# Patient Record
Sex: MALE | Race: WHITE | NOT HISPANIC OR LATINO | URBAN - METROPOLITAN AREA
[De-identification: names, ages, dates, MRNs, and addresses within clinical notes are randomized per-mention and may not be internally consistent; named-entity substitution may affect disease eponyms.]

---

## 2024-05-16 ENCOUNTER — TELEPHONE (OUTPATIENT)
Dept: UROLOGY | Facility: CLINIC | Age: 59
End: 2024-05-16
Payer: OTHER GOVERNMENT

## 2024-05-17 NOTE — PROGRESS NOTES
"Subjective:      Freedom Carranza is a 59 y.o. male who presents today regarding his     Referred by Dr Layton for elevated psa    No LUTS  Slow stream but not bothersome  Nocturia*1 not bothersome    Pt is     2 uncles with prostate ca  Diagnosed at 80yo and approx 64yo    His father-in-law recently passed away  with pca      .    The following portions of the patient's history were reviewed and updated as appropriate: allergies, current medications, past family history, past medical history, past social history, past surgical history and problem list.    Review of Systems  Pertinent items are noted in HPI.  A comprehensive multipoint review of systems was negative except as otherwise stated in the HPI.    No past medical history on file.  No past surgical history on file.    Review of patient's allergies indicates:  Not on File       Objective:   Vitals: There were no vitals taken for this visit.    Physical Exam   General: alert and oriented, no acute distress  Respiratory: Symmetric expansion, non-labored breathing  Cardiovascular: no peripheral edema  Abdomen: soft, non distended  Skin: normal coloration and turgor, no rashes, no suspicious skin lesions noted  Neuro: no gross deficits  Psych: normal judgment and insight, normal mood/affect, and non-anxious  NICO nodule in right mid prostate      Physical Exam    Lab Review   Urinalysis demonstrates negative for all components  No results found for: "WBC", "HGB", "HCT", "MCV", "PLT"  No results found for: "CREATININE", "BUN"    PSA 4.6  Previously 1.5 per Dr Layton's notes    5/10/2024  4.86  11/2023  1.8  5/5/2023  3.38  10/2022  1.7  4/28/2022  2.14  4/26/2021  1.3      Imaging  -    Assessment and Plan:   Prostate nodule    Elevated psa        MRI prostate  UA and reflex cs  Cipro  enemas  BMP  Uronav prostate biopsy    RTC 2 weeks after above to discuss path      "

## 2024-05-20 ENCOUNTER — OFFICE VISIT (OUTPATIENT)
Dept: UROLOGY | Facility: CLINIC | Age: 59
End: 2024-05-20
Payer: OTHER GOVERNMENT

## 2024-05-20 VITALS
DIASTOLIC BLOOD PRESSURE: 62 MMHG | OXYGEN SATURATION: 100 % | RESPIRATION RATE: 12 BRPM | HEART RATE: 65 BPM | SYSTOLIC BLOOD PRESSURE: 109 MMHG

## 2024-05-20 DIAGNOSIS — N40.2 PROSTATE NODULE: Primary | ICD-10-CM

## 2024-05-20 LAB
BILIRUB UR QL STRIP: NEGATIVE
CLARITY UR REFRACT.AUTO: CLEAR
COLOR UR AUTO: YELLOW
GLUCOSE UR QL STRIP: NEGATIVE
HGB UR QL STRIP: NEGATIVE
KETONES UR QL STRIP: NEGATIVE
LEUKOCYTE ESTERASE UR QL STRIP: NEGATIVE
NITRITE UR QL STRIP: NEGATIVE
PH UR STRIP: 7 [PH] (ref 5–8)
PROT UR QL STRIP: ABNORMAL
SP GR UR STRIP: 1.02 (ref 1–1.03)
URN SPEC COLLECT METH UR: ABNORMAL

## 2024-05-20 PROCEDURE — 81003 URINALYSIS AUTO W/O SCOPE: CPT | Performed by: UROLOGY

## 2024-05-20 PROCEDURE — 99204 OFFICE O/P NEW MOD 45 MIN: CPT | Mod: S$GLB,,, | Performed by: UROLOGY

## 2024-05-20 RX ORDER — CIPROFLOXACIN 500 MG/1
500 TABLET ORAL 2 TIMES DAILY
Qty: 4 TABLET | Refills: 0 | Status: SHIPPED | OUTPATIENT
Start: 2024-05-20 | End: 2024-05-22

## 2024-05-20 NOTE — Clinical Note
UA and reflex cs Please review prostate biopsy instructions Uronav biopsy RTC 2 weeks after biopsy to review path

## 2024-06-12 ENCOUNTER — TELEPHONE (OUTPATIENT)
Dept: UROLOGY | Facility: CLINIC | Age: 59
End: 2024-06-12
Payer: OTHER GOVERNMENT

## 2024-06-12 NOTE — TELEPHONE ENCOUNTER
----- Message from Rafiq Krishnamurthy MA sent at 6/12/2024 12:56 PM CDT -----    ----- Message -----  From: Kristel Galvan  Sent: 6/12/2024  11:29 AM CDT  To: Sarath Lauren Staff    Name of Who is Calling: JENSEN TAY [[id number]]          What is the request in detail: Pt is requesting a call back regarding a few questions he have before his procedure on 6/25. Please assist.           Can the clinic reply by [facility]: No          What Number to Call Back if not in [facility]: [phone]

## 2024-06-12 NOTE — TELEPHONE ENCOUNTER
Long discussion about upcoming POC. Reviewed biopsy and MRI. Billing's number given per request.

## 2024-06-17 ENCOUNTER — HOSPITAL ENCOUNTER (OUTPATIENT)
Dept: RADIOLOGY | Facility: HOSPITAL | Age: 59
Discharge: HOME | End: 2024-06-17
Attending: UROLOGY
Payer: OTHER GOVERNMENT

## 2024-06-17 DIAGNOSIS — N40.2 PROSTATE NODULE: ICD-10-CM

## 2024-06-17 PROCEDURE — 25500020 PHARM REV CODE 255: Performed by: UROLOGY

## 2024-06-17 PROCEDURE — 72197 MRI PELVIS W/O & W/DYE: CPT | Mod: 26,,, | Performed by: INTERNAL MEDICINE

## 2024-06-17 PROCEDURE — A9585 GADOBUTROL INJECTION: HCPCS | Performed by: UROLOGY

## 2024-06-17 PROCEDURE — 72197 MRI PELVIS W/O & W/DYE: CPT | Mod: TC

## 2024-06-17 RX ORDER — GADOBUTROL 604.72 MG/ML
10 INJECTION INTRAVENOUS
Status: COMPLETED | OUTPATIENT
Start: 2024-06-17 | End: 2024-06-17

## 2024-06-17 RX ADMIN — GADOBUTROL 10 ML: 604.72 INJECTION INTRAVENOUS at 05:06

## 2024-06-21 ENCOUNTER — PROCEDURE VISIT (OUTPATIENT)
Dept: UROLOGY | Facility: CLINIC | Age: 59
End: 2024-06-21
Payer: OTHER GOVERNMENT

## 2024-06-21 VITALS
WEIGHT: 184.31 LBS | SYSTOLIC BLOOD PRESSURE: 129 MMHG | DIASTOLIC BLOOD PRESSURE: 77 MMHG | TEMPERATURE: 98 F | HEART RATE: 80 BPM | RESPIRATION RATE: 17 BRPM

## 2024-06-21 DIAGNOSIS — N40.2 PROSTATE NODULE: Primary | ICD-10-CM

## 2024-06-21 RX ORDER — LIDOCAINE HYDROCHLORIDE 10 MG/ML
20 INJECTION INFILTRATION; PERINEURAL
Status: COMPLETED | OUTPATIENT
Start: 2024-06-21 | End: 2024-06-21

## 2024-06-21 RX ORDER — LIDOCAINE HYDROCHLORIDE 20 MG/ML
JELLY TOPICAL
Status: COMPLETED | OUTPATIENT
Start: 2024-06-21 | End: 2024-06-21

## 2024-06-21 RX ORDER — CEFTRIAXONE 1 G/1
1 INJECTION, POWDER, FOR SOLUTION INTRAMUSCULAR; INTRAVENOUS
Status: COMPLETED | OUTPATIENT
Start: 2024-06-21 | End: 2024-06-21

## 2024-06-21 RX ADMIN — CEFTRIAXONE 1 G: 1 INJECTION, POWDER, FOR SOLUTION INTRAMUSCULAR; INTRAVENOUS at 12:06

## 2024-06-21 RX ADMIN — LIDOCAINE HYDROCHLORIDE 20 ML: 10 INJECTION INFILTRATION; PERINEURAL at 12:06

## 2024-06-21 RX ADMIN — LIDOCAINE HYDROCHLORIDE: 20 JELLY TOPICAL at 12:06

## 2024-06-21 NOTE — PATIENT INSTRUCTIONS
What to Expect After a Prostate Biopsy    You may have mild bleeding from the rectum or urine for about 1 week to 1 month, or in your ejaculate for several months. This bleeding is normal and expected, and it will stop. You may have mild discomfort in your rectal or urethral area for 24-48 hours.    You cannot do any strenuous lifting, straining, or exercising for 24 hours. You may return to full activity the day after the biopsy.    You may continue to take all your regular medications after the procedure except for the blood thinners.    You may resume all blood-thinning medications once you no longer see any bleeding or whenever your physician prescribing the medication says it is all right to do so. You may take Tylenol if you have a fever and your temperature is less than 100° F or if you have some discomfort.    You will receive a call from the Urology Department at [facility] with the results of your prostate biopsy within one week.    Signs and Symptoms to Report    Call your [facility] urologist at [phone] if you develop any of the following:  Temperature greater than 101°  F  Inability to urinate  A large amount of bleeding from the rectum or in the urine  Persistent or severe pain    After hours or on weekends, you may reach a urology resident on call at this number: [phone].

## 2024-06-21 NOTE — PROCEDURES
"TRUS    Date/Time: 6/21/2024 12:41 PM    Performed by: Leonidas Clemens MD  Authorized by: Leonidas Clemens MD    Consent Done?:  Yes (Written)  Time out: Immediately prior to procedure a "time out" was called to verify the correct patient, procedure, equipment, support staff and site/side marked as required.    Indications: Prostate Nodules and Elevated PSA    Preparation: Patient was prepped and draped in usual sterile fashion    Position:  Left lateral  Anesthesia:  Lidocaine jelly, Pudendal nerve block and 20cc's 1% Lidocaine  Patient sedated: No    Prostate Size:  52  Lesions:: Yes         Type:  Mixed hypo- and hyperechoic  Left Base Biopsies: 2  Left Mid Biopsies: 2  Left [place] Biopsies: 2  Right Base Biopsies: 2  Right Mid Biopsies: 2  Right [place] Biopsies: 2  Transitional zone: No    Total Biopsies:  12    Patient tolerance:  Patient tolerated the procedure well with no immediate complications     MR and US imaged segmented and co-registered with uronav  Subtle mixed hypo-hyperechoic area corresponding to PIRADS5 nodule on exam    4 additional cores taken from target for total of 16 cores    Urine cs neg  cipro and enemas and Rocephin done      Standard instructions given  RTC 2 weeks to discuss pathology results    If path is +, we will get PSMA PET to eval bone lesion      "

## 2024-07-02 ENCOUNTER — OFFICE VISIT (OUTPATIENT)
Dept: UROLOGY | Facility: CLINIC | Age: 59
End: 2024-07-02
Payer: OTHER GOVERNMENT

## 2024-07-02 ENCOUNTER — TELEPHONE (OUTPATIENT)
Dept: UROLOGY | Facility: CLINIC | Age: 59
End: 2024-07-02
Payer: OTHER GOVERNMENT

## 2024-07-02 VITALS — HEART RATE: 66 BPM | DIASTOLIC BLOOD PRESSURE: 73 MMHG | SYSTOLIC BLOOD PRESSURE: 112 MMHG | WEIGHT: 183.56 LBS

## 2024-07-02 DIAGNOSIS — C61 PROSTATE CANCER: Primary | ICD-10-CM

## 2024-07-02 PROCEDURE — 99213 OFFICE O/P EST LOW 20 MIN: CPT | Mod: PBBFAC | Performed by: UROLOGY

## 2024-07-02 PROCEDURE — 99999 PR PBB SHADOW E&M-EST. PATIENT-LVL III: CPT | Mod: PBBFAC,,, | Performed by: UROLOGY

## 2024-07-02 PROCEDURE — 99215 OFFICE O/P EST HI 40 MIN: CPT | Mod: S$PBB,,, | Performed by: UROLOGY

## 2024-07-02 RX ORDER — ALLOPURINOL 100 MG/1
100 TABLET ORAL
COMMUNITY

## 2024-07-02 NOTE — Clinical Note
PSMA PET Prolaris genetic testing Genetics referral for germline testing RTC to see Dr Clemens and Dr Charles after above

## 2024-07-02 NOTE — PROGRESS NOTES
"Subjective:      Freedom Carranza is a 59 y.o. male who returns today regarding his     Hematuria resolved  No blood in the stool.    No fever        The following portions of the patient's history were reviewed and updated as appropriate: allergies, current medications, past family history, past medical history, past social history, past surgical history and problem list.    Review of Systems  Pertinent items are noted in HPI.  A comprehensive multipoint review of systems was negative except as otherwise stated in the HPI.    No past medical history on file.  No past surgical history on file.    Review of patient's allergies indicates:  No Known Allergies       Objective:   Vitals: There were no vitals taken for this visit.    Physical Exam   General: alert and oriented, no acute distress  Respiratory: Symmetric expansion, non-labored breathing  Cardiovascular: no peripheral edema  Abdomen: soft, non distended  Skin: normal coloration and turgor, no rashes, no suspicious skin lesions noted  Neuro: no gross deficits  Psych: normal judgment and insight, normal mood/affect, and non-anxious    Physical Exam    Lab Review   Urinalysis demonstrates no specimen    No results found for: "WBC", "HGB", "HCT", "MCV", "PLT"  No results found for: "CREATININE", "BUN"    No results found for: "PSA", "PSADIAG", "PSATOTAL", "PSAFREE", "PSAFREEPCT"    Final Pathologic Diagnosis 1. Prostate, left apex, biopsy:      - Benign prostatic tissue    2. Prostate, left middle, biopsy:      - Benign prostatic tissue    3. Prostate, left base, biopsy:      - Benign prostatic tissue    4. Prostate, right apex, biopsy:      - Prostatic acinar adenocarcinoma      - [place] grade: 3+4, Group 2      - The percentage of Jose grade 4: 5%      - One of two cores positive for carcinoma      - The percentage of tissue with carcinoma: 25%      - The linear amount of tissue with carcinoma: 3 mm      5. Prostate, right middle, biopsy:      - Prostatic " acinar adenocarcinoma      - [place] grade: 4+4, Group 4      - One of two cores positive for carcinoma      - The percentage of tissue with carcinoma: 10%      - The linear amount of tissue with carcinoma: 0.5 mm      6. Prostate, right base, biopsy:      - Benign prostatic tissue      7. Prostate, target lesion, biopsy:      - Prostatic acinar adenocarcinoma      - [place] grade: 4+3, Group 3      - The percentage of [place] grade 4: 80%      - The linear amount of tissue with carcinoma: the specimen is fragmented, overall 8 mm      - The percentage of tissue with carcinoma: the specimen is fragmented, overall 50%   Comment: Interp By Wendie Uriostegui M.D., Signed on 06/28/2024 at 10:32       PSA 2.59 6/15/2024  4.86 5/11/2024    Imaging  MRI PROSTATE W W/O CONTRAST     CLINICAL HISTORY:  Prostate cancer suspected;nodule on right mid prostate; psa 4.8;  Nodular prostate without lower urinary tract symptoms     Additional history: None provided.     TECHNIQUE:  Multiparametric MRI of the prostate/pelvis performed on a 3T scanner with phase pelvic coil. Multiplanar, multisequence images including high resolution, small field-of-view T2-WI; axial diffusion weighted images with multiple B-values and creation of ADC-maps; and dynamic contrast enhanced T1-weighted images through the prostate were obtained before, during, and after the administration of 10 cc intravenous gadolinium.     COMPARISON:  None.     FINDINGS:  Previous biopsy: None     PSA: 4.6 ng/mL 05/20/2024     Prior therapy: None     Prostate: 5.5 x 3.7 x 4.8 cm corresponding to a computed volume of 50 cc.     Peripheral zone: Suspicious lesion within the right peripheral zone.  Right peripheral zone is atrophic.     Lesion (MARV) #P-1     Location: Side: right; Region: Mid/apex; Zone: posterior peripheral zone laterally     Greatest dimension: 1.5 cm     T2-WI: Same as 4 but ?1.5 cm in greatest dimension or definite extraprostatic extension/invasive behavior,  score 5.     DWI/ADC: Same as 4 but ?1.5 cm in greatest dimension or definite extraprostatic extension/invasive behavior, score 5.     DCE: Positive     Extraprostatic extension: Broad abutment of the posterior capsule.  No ayah extraprostatic extension.     PI-RADS assessment category: 5     Transitional zone: Benign prostatic hyperplasia without focal suspicious abnormality.     Neurovascular bundle: Normal appearance.     Seminal vesicles: Normal appearance.     Adjacent Organ Involvement: No evidence for urinary bladder or rectal invasion.     Lymphadenopathy: None.     Other Findings: Hypointense lesion within the right pubic bone measuring 1.1 cm, nonspecific.     Impression:     1.5 cm PI-RADS 5 lesion in the right peripheral zone.  There is broad-based capsular abutment but no ayah extraprostatic extension.     No regional lymphadenopathy.     1.1 cm lesion in the right pubic bone, which is nonspecific.  Osseous metastasis is unlikely given PSA values.     Overall Assessment: PI-RADS 5 - Very high (clinically significant cancer is highly likely to be present).     Number of targets created for potential MR/US fusion biopsy:     Peripheral zone: 1     Transition zone: 0     Electronically signed by resident: Alex Fagan  Date:                                            06/18/2024  Time:                                           08:22     Electronically signed by:Dae Plummer  Date:                                            06/18/2024  Time:                                           21:04    Assessment and Plan:   Prostate cancer  Beattie 8 4+4 GG4 cT2 cN0 cMx; psa 2.59    We discussed that he has prostate cancer and the high risk nature of his disease.  We discussed the risks and benefits of sugery, radiation, chemotherapy, hormonal therapy, and combinations of these.  We discussed open and robotic surgical approaches.  We discussed that he is likely to need multiple forms of treatment given his high risk  disease.  We discussed that active surveillance may not be the best option with high risk disease.  We discussed referral to radiation oncology and medical oncology.    PSMA PET then see me and rad onc asap  Prolaris somatic genetic testing    Bone lesion  PSMA PET      Fam history of prostate cancer  Genetics referral      I spent 45 min on the day of this encounter preparing for, treating and managing the above

## 2024-07-10 ENCOUNTER — HOSPITAL ENCOUNTER (OUTPATIENT)
Dept: RADIOLOGY | Facility: HOSPITAL | Age: 59
Discharge: HOME | End: 2024-07-10
Attending: UROLOGY
Payer: OTHER GOVERNMENT

## 2024-07-10 DIAGNOSIS — C61 PROSTATE CANCER: ICD-10-CM

## 2024-07-10 PROCEDURE — 78815 PET IMAGE W/CT SKULL-THIGH: CPT | Mod: 26,PI,, | Performed by: NUCLEAR MEDICINE

## 2024-07-10 PROCEDURE — A9595 HC PIFLUFOLASTAT F-18, DX, PER 1 MCI: HCPCS | Mod: TB | Performed by: UROLOGY

## 2024-07-10 PROCEDURE — 78815 PET IMAGE W/CT SKULL-THIGH: CPT | Mod: TC

## 2024-07-10 RX ADMIN — PIFLUFOLASTAT F-18 10.5 MILLICURIE: 80 INJECTION INTRAVENOUS at 02:07

## 2024-07-22 ENCOUNTER — OFFICE VISIT (OUTPATIENT)
Dept: RADIATION ONCOLOGY | Facility: CLINIC | Age: 59
End: 2024-07-22
Attending: RADIOLOGY
Payer: OTHER GOVERNMENT

## 2024-07-22 VITALS
DIASTOLIC BLOOD PRESSURE: 64 MMHG | OXYGEN SATURATION: 98 % | HEIGHT: 72 IN | SYSTOLIC BLOOD PRESSURE: 110 MMHG | WEIGHT: 179.5 LBS | TEMPERATURE: 98 F | RESPIRATION RATE: 16 BRPM | BODY MASS INDEX: 24.31 KG/M2 | HEART RATE: 60 BPM

## 2024-07-22 DIAGNOSIS — C61 PROSTATE CANCER: ICD-10-CM

## 2024-07-22 PROCEDURE — 99214 OFFICE O/P EST MOD 30 MIN: CPT | Mod: PBBFAC | Performed by: RADIOLOGY

## 2024-07-22 PROCEDURE — 99999 PR PBB SHADOW E&M-EST. PATIENT-LVL IV: CPT | Mod: PBBFAC,,, | Performed by: RADIOLOGY

## 2024-07-22 PROCEDURE — 99205 OFFICE O/P NEW HI 60 MIN: CPT | Mod: S$PBB,,, | Performed by: RADIOLOGY

## 2024-07-22 NOTE — PROGRESS NOTES
Multidisciplinary Uro-Oncology Clinic  [facility] - Radiation Oncology     HISTORY OF PRESENT ILLNESS:   This patient presents for discussion of treatment options for a recently diagnosed prostate cancer.     Mr. Carranza was referred to Dr. Cleemns for evaluation of an elevated PSA of 4.86 ng/ml.  The patient has a family history of prostate cancer in his uncles.  NICO revealed a nodule on the Rt.   MRI revealed a 50 cc prostate with a 1.5 cm PI-RADS 5 lesion in the Rt. mid/apex with broad abutment of the posterior capsule.  The seminal vesicles and neurovascular bundles were unremarkable. There was no adenopathy. There was a 1.1 cm non specific lesion in the Rt. pubic bone.  Biopsies on 6/21/24 revealed Jose 8 (4+4) adenocarcinoma involving 10% of 1/2 cores from the Rt.. mid gland.  There was Jose 7 (4+3) carcinoma involving 50% of the cores from the target lesion.  The [place] score 4 accounted for 80% of the tumor.  There was [place] 7 (3+4) adenocarcinoma involving 25% of 1/2 cores from the Rt. apex.  PSMA multifocal low level tracer avidity in the prostate with no evidence of regional or distant metastatic disease.  The patient presents for discussion of treatment options.     REVIEW OF SYSTEMS:   Review of Systems   Constitutional:  Negative for chills, fever, malaise/fatigue and weight loss.   Gastrointestinal:  Negative for constipation and diarrhea.   Genitourinary:  Negative for dysuria, frequency, hematuria and urgency.        AUA 1, 1, 1, 0, 2, 0, 2  delighted   TJ 24  EPIC 26 completed     PAST MEDICAL HISTORY:  Past Medical History:   Diagnosis Date    Gout, unspecified     Mixed hyperlipidemia     Unspecified astigmatism, unspecified eye     Unspecified sensorineural hearing loss        PAST SURGICAL HISTORY:  History reviewed. No pertinent surgical history.    ALLERGIES:   Review of patient's allergies indicates:  No Known Allergies    MEDICATIONS:  Current Outpatient  Medications   Medication Sig    allopurinoL (ZYLOPRIM) 100 MG tablet Take 100 mg by mouth.     No current facility-administered medications for this visit.       SOCIAL HISTORY:  Social History     Socioeconomic History    Marital status:    Tobacco Use    Smoking status: Never    Smokeless tobacco: Never   Substance and Sexual Activity    Alcohol use: Not Currently     Social Determinants of Health     Financial Resource Strain: Low Risk  (6/14/2024)    Overall Financial Resource Strain (CARDIA)     Difficulty of Paying Living Expenses: Not hard at all   Food Insecurity: No Food Insecurity (6/14/2024)    Hunger Vital Sign     Worried About Running Out of Food in the Last Year: Never true     Ran Out of Food in the Last Year: Never true   Physical Activity: Sufficiently Active (6/14/2024)    Exercise Vital Sign     Days of Exercise per Week: 4 days     Minutes of Exercise per Session: 90 min   Stress: No Stress Concern Present (6/14/2024)    [place] [employer] - Occupational Stress Questionnaire     Feeling of Stress : Only a little   Housing Stability: Unknown (6/14/2024)    Housing Stability Vital Sign     Unable to Pay for Housing in the Last Year: No       FAMILY HISTORY:  Family History   Problem Relation Name Age of Onset    Stroke Mother      Hypertension Mother      Heart disease Mother      Hypertension Father      Cancer Father          Skin cancer    COPD Father      Deep vein thrombosis Maternal Grandfather      Kidney disease Paternal Grandmother      Alzheimer's disease Paternal Grandmother      Hypertension Paternal Grandfather      Heart disease Paternal Grandfather      Cancer Maternal Uncle Uncle- Maternal 64        Prostate and pancreatic    Cancer Maternal Uncle Uncle-Maternal         Bladder and Prostate    Cancer Maternal Aunt          Chronic leukemia    Cancer Maternal Aunt  66        Colorectal    Hypertension Paternal Uncle      Heart disease Paternal Uncle       Cirrhosis Paternal Uncle      Heart disease Paternal Uncle      Cancer Paternal Uncle          Skin    Heart disease Paternal Aunt      Hypertension Paternal Aunt      Alzheimer's disease Paternal Aunt      Diabetes Paternal Aunt           PHYSICAL EXAMINATION:  Vitals:    24 1258   BP: 110/64   Pulse: 60   Resp: 16   Temp: 97.6 °F (36.4 °C)     Physical Exam  Constitutional:       General: He is not in acute distress.     Appearance: Normal appearance.   Neurological:      Mental Status: He is alert and oriented to person, place, and time.   Psychiatric:         Mood and Affect: Mood normal.         Judgment: Judgment normal.       ASSESSMENT/PLAN:  Clinical stage IIC (T2b, N0, M0, GG4, PSA < 10) adenocarcinoma of the prostate    ECO    I had a long talk with Freedom and his wife, Maday.  We reviewed his presentation and explained he is considered to have favorable high risk prostate cancer.  Discussed the implications of this classification and discussed the management options.  Explained based on his age and stage, we would recommend he consider some form of definitive therapy.  Discussed the options of RALP with bilateral node dissection vs definitive radiotherapy using IMRT / IGRT techniques +/- brachytherapy boost.  We discussed the pros and cons of each approach.  Discussed the procedures, risks and benefits of each approach.  Explained the acute and long term side effects of treatment. We reviewed the data supporting combined modality with radiotherapy and hormonal deprivation therapy.  Discussed germline testing and obtaining a Polaris molecular score. The patient would like to proceed with radiotherapy..  Will plan to check the progress of his Polaris evaluation.  Thank you for allowing us to participate in the care of this patient.      Psychosocial Distress screening score of Distress Score: 4 noted and reviewed. No intervention indicated.     I spent approximately 60 minutes reviewing the  available records and evaluating the patient, out of which over 50% of the time was spent face to face with the patient in counseling and coordinating this patient's care.

## 2024-07-23 ENCOUNTER — TELEPHONE (OUTPATIENT)
Dept: UROLOGY | Facility: CLINIC | Age: 59
End: 2024-07-23
Payer: OTHER GOVERNMENT

## 2024-07-23 NOTE — TELEPHONE ENCOUNTER
Left message for Chiquita marquez Cornelius zuñiga.  ----- Message from Mala Mccarty CMA sent at 7/22/2024  3:59 PM CDT -----    ----- Message -----  From: Kristel Galvan  Sent: 7/22/2024   3:47 PM CDT  To: Sarath Lauren Staff    Name of Who is Calling:  Brisa from [employer] is calling on behalf of JENSEN TAY [[id number]]          What is the request in detail:  Brisa is calling to advised the test Prolaris that was ordered is not covered by insurance. Please assist. Prolaris          Can the clinic reply by [facility]: No          What Number to Call Back if not in [facility]: [phone]

## 2024-07-31 ENCOUNTER — TELEPHONE (OUTPATIENT)
Dept: UROLOGY | Facility: CLINIC | Age: 59
End: 2024-07-31
Payer: OTHER GOVERNMENT

## 2024-07-31 NOTE — TELEPHONE ENCOUNTER
LVM.    ----- Message from Nia Suárez sent at 7/31/2024 12:05 PM CDT -----  Regarding: Test  Name of Who is Calling:  [employer]ita [employer]          What is the request in detail:  Please contact Chiquita she would like to speak with Vickie about patient , Chiquita stated patient has  and the test is not covered            Can the clinic reply by [facility]: No            What [facility] Back [facility]: [phone] Ext [phone]

## 2024-08-01 ENCOUNTER — TELEPHONE (OUTPATIENT)
Dept: UROLOGY | Facility: CLINIC | Age: 59
End: 2024-08-01
Payer: OTHER GOVERNMENT

## 2024-08-01 NOTE — TELEPHONE ENCOUNTER
Spoke to Chiquita at [employer]- [facility] does not cover Prolaris. She will reach out to patient to see if he wants to proceed w/self-pay.

## 2024-08-29 ENCOUNTER — TELEPHONE (OUTPATIENT)
Dept: RADIATION ONCOLOGY | Facility: CLINIC | Age: 59
End: 2024-08-29
Payer: OTHER GOVERNMENT

## 2024-09-06 ENCOUNTER — TELEPHONE (OUTPATIENT)
Dept: RADIATION ONCOLOGY | Facility: CLINIC | Age: 59
End: 2024-09-06
Payer: OTHER GOVERNMENT

## 2024-09-06 NOTE — TELEPHONE ENCOUNTER
Called to schedule follow-up appointment, no answer. Left a voicemail requesting a call back. Contact information provided.

## 2025-03-07 DIAGNOSIS — M25.511 PAIN, JOINT, SHOULDER, RIGHT: Primary | ICD-10-CM

## 2025-03-11 ENCOUNTER — OFFICE VISIT (OUTPATIENT)
Dept: SPORTS MEDICINE | Facility: CLINIC | Age: 60
End: 2025-03-11
Payer: OTHER GOVERNMENT

## 2025-03-11 ENCOUNTER — HOSPITAL ENCOUNTER (OUTPATIENT)
Dept: RADIOLOGY | Facility: HOSPITAL | Age: 60
Discharge: HOME | End: 2025-03-11
Attending: PHYSICIAN ASSISTANT
Payer: OTHER GOVERNMENT

## 2025-03-11 VITALS
HEART RATE: 63 BPM | BODY MASS INDEX: 26.57 KG/M2 | HEIGHT: 72 IN | SYSTOLIC BLOOD PRESSURE: 99 MMHG | DIASTOLIC BLOOD PRESSURE: 61 MMHG | WEIGHT: 196.19 LBS

## 2025-03-11 DIAGNOSIS — M25.511 CHRONIC RIGHT SHOULDER PAIN: Primary | ICD-10-CM

## 2025-03-11 DIAGNOSIS — M25.611 SHOULDER STIFFNESS, RIGHT: ICD-10-CM

## 2025-03-11 DIAGNOSIS — G89.29 CHRONIC RIGHT SHOULDER PAIN: Primary | ICD-10-CM

## 2025-03-11 DIAGNOSIS — M25.511 PAIN, JOINT, SHOULDER, RIGHT: ICD-10-CM

## 2025-03-11 PROCEDURE — 73030 X-RAY EXAM OF SHOULDER: CPT | Mod: TC,RT

## 2025-03-11 PROCEDURE — 99214 OFFICE O/P EST MOD 30 MIN: CPT | Mod: PBBFAC,25 | Performed by: PHYSICIAN ASSISTANT

## 2025-03-11 PROCEDURE — 73030 X-RAY EXAM OF SHOULDER: CPT | Mod: 26,RT,, | Performed by: RADIOLOGY

## 2025-03-11 PROCEDURE — 99204 OFFICE O/P NEW MOD 45 MIN: CPT | Mod: S$PBB,,, | Performed by: PHYSICIAN ASSISTANT

## 2025-03-11 PROCEDURE — 99999 PR PBB SHADOW E&M-EST. PATIENT-LVL IV: CPT | Mod: PBBFAC,,, | Performed by: PHYSICIAN ASSISTANT

## 2025-03-11 RX ORDER — METFORMIN HYDROCHLORIDE 500 MG/1
TABLET ORAL
COMMUNITY
Start: 2025-01-20

## 2025-03-11 RX ORDER — FINASTERIDE 5 MG/1
TABLET, FILM COATED ORAL
COMMUNITY
Start: 2024-08-16

## 2025-03-11 RX ORDER — TAMSULOSIN HYDROCHLORIDE 0.4 MG/1
CAPSULE ORAL
COMMUNITY
Start: 2024-11-01

## 2025-03-11 RX ORDER — CIPROFLOXACIN 500 MG/1
TABLET ORAL
COMMUNITY
Start: 2024-10-30

## 2025-03-11 RX ORDER — CABERGOLINE 0.5 MG/1
TABLET ORAL
COMMUNITY
Start: 2024-08-16

## 2025-03-11 NOTE — PROGRESS NOTES
CC: RIGHT shoulder pain and stiffness      HPI:  Freedom presents with right shoulder pain ongoing for at least two months. He is unable to recall a specific incident or date when it began. The pain is not only in his shoulder but also in the area around his right ribcage/lat area. He has difficulty raising his right arm with limited range of motion compared to his left arm.    He has been receiving regular massages every couple of weeks, including cupping and shoulder work, but the condition has not improved. His massage therapist noted inflammation in the tissues around the ribcage. He has been taking natural supplements for inflammation, including Phyto Ultra Comfort, which contains turmeric, glucosamine, and chondroitin.    Freedom also reports neck pain, describing a loud crack when he releases tension while lying down. He is unsure if the pain is starting in the neck or radiating up from the shoulder.    He recently completed prostate cancer treatment in [place], [place], which included radiation therapy. During his final round of radiation, the shoulder pain was particularly severe. He considered seeing an orthopedic specialist in [place] but decided to wait until returning home.    Freedom mentions a history of frozen shoulder in his left shoulder a few years ago, which took months to resolve with physical therapy and cortisone injections. He expresses concern about the possibility of prostate cancer metastasis to the bones, though his radiation oncologist has advised that this is unlikely based on his other test results.    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    Freedom denies any numbness or tingling going down his arm, any pain in his back, and any acute injury to the shoulder. Freedom denies any history of diabetes or thyroid disease.    PREVIOUS TREATMENTS:  Freedom underwent treatment for left-sided frozen shoulder a few years ago, before  the pandemic. This treatment included cortisone injections and physical therapy over several months, which led to significant improvement. For his current right shoulder issue, he receives massage therapy every couple of weeks, including cupping and shoulder work, but has experienced minimal benefit over time. Freedom also performs tension release exercises when lying down, resulting in loud cracks in his neck.    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         Is affecting ADLs.  Pain is 3/10 at it's worst.      Past Medical History:   Diagnosis Date    Gout, unspecified     Mixed hyperlipidemia     Unspecified astigmatism, unspecified eye     Unspecified sensorineural hearing loss        History reviewed. No pertinent surgical history.    Family History   Problem Relation Name Age of Onset    Stroke Mother      Hypertension Mother      Heart disease Mother      Hypertension Father      Cancer Father          Skin cancer    COPD Father      Deep vein thrombosis Maternal Grandfather      Kidney disease Paternal Grandmother      Alzheimer's disease Paternal Grandmother      Hypertension Paternal Grandfather      Heart disease Paternal Grandfather      Cancer Maternal Uncle Uncle- Maternal 64        Prostate and pancreatic    Cancer Maternal Uncle Uncle-Maternal         Bladder and Prostate    Cancer Maternal Aunt          Chronic leukemia    Cancer Maternal Aunt  66        Colorectal    Hypertension Paternal Uncle      Heart disease Paternal Uncle      Cirrhosis Paternal Uncle      Heart disease Paternal Uncle      Cancer Paternal Uncle          Skin    Heart disease Paternal Aunt      Hypertension Paternal Aunt      Alzheimer's disease Paternal Aunt      Diabetes Paternal Aunt         Current Medications[1]    Review of patient's allergies indicates:  No Known Allergies       REVIEW OF SYSTEMS:  Constitution: Negative. Negative for chills, fever  "and night sweats.   HENT: Negative for congestion and headaches.    Eyes: Negative for blurred vision, left vision loss and right vision loss.   Cardiovascular: Negative for chest pain and syncope.   Respiratory: Negative for cough and shortness of breath.    Endocrine: Negative for polydipsia, polyphagia and polyuria.   Hematologic/Lymphatic: Negative for bleeding problem. Does not bruise/bleed easily.   Skin: Negative for dry skin, itching and rash.   Musculoskeletal: Negative for falls.  Positive for right shoulder pain and muscle weakness.   Gastrointestinal: Negative for abdominal pain and bowel incontinence.   Genitourinary: Negative for bladder incontinence and nocturia.   Neurological: Negative for disturbances in coordination, loss of balance and seizures.   Psychiatric/Behavioral: Negative for depression. The patient does not have insomnia.    Allergic/Immunologic: Negative for hives and persistent infections.      PHYSICAL EXAMINATION:  Vitals:  BP 99/61   Pulse 63   Ht 5' 11.5" (1.816 m)   Wt 89 kg (196 lb 3.4 oz)   BMI 26.98 kg/m²    General: The patient is alert and oriented x 3.  Mood is pleasant.  Observation of ears, eyes and nose reveal no gross abnormalities.  No labored breathing observed.  Gait is coordinated. Patient can toe walk and heel walk without difficulty.      RIGHT SHOULDER / UPPER EXTREMITY EXAM    OBSERVATION:     Swelling  none  Deformity  none   Discoloration  none   Scapular winging none   Scars   none  Atrophy  none    TENDERNESS / CREPITUS (T/C):          T/C      T/C   Clavicle   -/-  SUPRAspinatus    -/-     AC Jt.    -/-  INFRAspinatus  -/-    SC Jt.    -/-  Deltoid    -/-      G. Tuberosity  -/-  LH BICEP groove  -/-   Acromion:  -/-  Midline Neck   -/-     Scapular Spine -/-  Trapezium   -/-   SMA Scapula  -/-  GH jt. line - post  -/-     Scapulothoracic  -/-         ROM: (* = with pain)  Left shoulder   Right shoulder        AROM (PROM)   AROM (PROM)   FE    170° " (175°)     120°* (125°*)     ER at 0°    60°  (65°)    45°*  (50°*)   ER at 90° ABD  90°  (90°)    50°*  (50°*)   IR at 90°  ABD   60  (60°)     40*  (40°*)      IR (spine level)   T10     Sacrum*    STRENGTH: (* = with pain) Left shoulder   Right shoulder   SCAPTION   5/5    4+/5*    IR    5/5    5/5   ER    5/5    4+/5*   BICEPS   5/5    5/5   Deltoid    5/5    4+/5     SIGNS:  Painful side       NEER   +   OARIKS  +    HARRY   +   SPEEDS  neg     DROP ARM   -   BELLY PRESS neg   Superior escape none    LIFT-OFF  neg   X-Body ADD    +   MOVING VALGUS neg        STABILITY TESTING    Left shoulder   Right shoulder    Translation     Anterior  up face     up face    Posterior  up face    up face    Sulcus   < 10mm    < 10 mm     Signs   Apprehension   neg      neg       Relocation   no change     no change      Jerk test  neg     neg    EXTREMITY NEURO-VASCULAR EXAM:    Sensation grossly intact to light touch all dermatomal regions.    DTR 2+ Biceps, Triceps, BR and Negative Aniyahs sign   Grossly intact motor function at Elbow, Wrist and Hand   Distal pulses radial and ulnar 2+, brisk cap refill, symmetric.      NECK:  Painless FROM and spinous processes non-tender. Negative Spurlings sign.      OTHER FINDINGS:  - scapular dyskinesia    XRAYS right shoulder (3/11/2025):  Xrays including AP, Outlet and Axillary Lateral of shoulder are ordered / images reviewed by me:  No acute fracture or dislocation.  Mild glenohumeral and moderate acromioclavicular joint osteoarthritis noted.  Soft tissues appear normal.          ASSESSMENT:     Right shoulder pain/stiffness, probable adhesive capsulitis      PLAN:      I made the decision to obtain old records of the patient including previous notes and imaging. New imaging was ordered today of the extremity or extremities evaluated. I independently reviewed and interpreted the radiographs and/or MRIs today as well as prior imaging, if available.    We discussed at  length different treatment options including conservative vs surgical management. These include anti-inflammatories, acetaminophen, rest, ice, heat, formal physical therapy including strengthening and stretching exercises, home exercise programs, dry needling, corticosteroid injections, and finally surgical intervention.      Orders placed for MRI without contrast of the right shoulder to assess for possible adhesive capsulitis and concomitant rotator cuff tear    In the interim, continue conservative treatment at home including over-the-counter anti-inflammatories/acetaminophen, regular warm/cool compresses, and activity modification.    Follow up in clinic to discuss MRI results and treatment moving forward.      All questions were answered, patient will contact us for questions or concerns in the interim.      This note was generated with the assistance of a combination of ambient listening technology and medical dictation software. Verbal consent was obtained by the patient and accompanying visitor(s) for the recording of patient appointment to facilitate this note. I attest to having reviewed and edited the generated note for accuracy, though some syntax, spelling errors, or grammatic errors may persist. Please contact the author of this note for any clarification.               [1]   Current Outpatient Medications:     allopurinoL (ZYLOPRIM) 100 MG tablet, Take 100 mg by mouth., Disp: , Rfl:     cabergoline (DOSTINEX) 0.5 mg tablet, , Disp: , Rfl:     ciprofloxacin HCl (CIPRO) 500 MG tablet, , Disp: , Rfl:     finasteride (PROSCAR) 5 mg tablet, , Disp: , Rfl:     metFORMIN (GLUCOPHAGE) 500 MG tablet, , Disp: , Rfl:     tamsulosin (FLOMAX) 0.4 mg Cap, , Disp: , Rfl:

## 2025-03-14 ENCOUNTER — HOSPITAL ENCOUNTER (OUTPATIENT)
Dept: RADIOLOGY | Facility: HOSPITAL | Age: 60
Discharge: HOME | End: 2025-03-14
Attending: PHYSICIAN ASSISTANT
Payer: OTHER GOVERNMENT

## 2025-03-14 DIAGNOSIS — M25.611 SHOULDER STIFFNESS, RIGHT: ICD-10-CM

## 2025-03-14 DIAGNOSIS — M25.511 CHRONIC RIGHT SHOULDER PAIN: ICD-10-CM

## 2025-03-14 DIAGNOSIS — G89.29 CHRONIC RIGHT SHOULDER PAIN: ICD-10-CM

## 2025-03-14 PROCEDURE — 73222 MRI JOINT UPR EXTREM W/DYE: CPT | Mod: 26,RT,, | Performed by: RADIOLOGY

## 2025-03-14 PROCEDURE — 73222 MRI JOINT UPR EXTREM W/DYE: CPT | Mod: TC,RT

## 2025-03-18 ENCOUNTER — OFFICE VISIT (OUTPATIENT)
Dept: SPORTS MEDICINE | Facility: CLINIC | Age: 60
End: 2025-03-18
Payer: OTHER GOVERNMENT

## 2025-03-18 DIAGNOSIS — G89.29 CHRONIC RIGHT SHOULDER PAIN: Primary | ICD-10-CM

## 2025-03-18 DIAGNOSIS — M75.01 ADHESIVE CAPSULITIS OF RIGHT SHOULDER: ICD-10-CM

## 2025-03-18 DIAGNOSIS — M25.511 CHRONIC RIGHT SHOULDER PAIN: Primary | ICD-10-CM

## 2025-03-18 DIAGNOSIS — M25.611 SHOULDER STIFFNESS, RIGHT: ICD-10-CM

## 2025-03-18 PROCEDURE — 99214 OFFICE O/P EST MOD 30 MIN: CPT | Mod: 25,S$PBB,, | Performed by: PHYSICIAN ASSISTANT

## 2025-03-18 PROCEDURE — 99999PBSHW PR PBB SHADOW TECHNICAL ONLY FILED TO HB: Mod: PBBFAC,,,

## 2025-03-18 PROCEDURE — 99999 PR PBB SHADOW E&M-EST. PATIENT-LVL III: CPT | Mod: PBBFAC,,, | Performed by: PHYSICIAN ASSISTANT

## 2025-03-18 PROCEDURE — 99213 OFFICE O/P EST LOW 20 MIN: CPT | Mod: PBBFAC | Performed by: PHYSICIAN ASSISTANT

## 2025-03-18 RX ORDER — CELECOXIB 100 MG/1
100 CAPSULE ORAL 2 TIMES DAILY
Qty: 60 CAPSULE | Refills: 1 | Status: SHIPPED | OUTPATIENT
Start: 2025-03-18

## 2025-03-18 RX ORDER — TRIAMCINOLONE ACETONIDE 40 MG/ML
40 INJECTION, SUSPENSION INTRA-ARTICULAR; INTRAMUSCULAR
Status: DISCONTINUED | OUTPATIENT
Start: 2025-03-18 | End: 2025-03-18 | Stop reason: HOSPADM

## 2025-03-18 RX ADMIN — TRIAMCINOLONE ACETONIDE 40 MG: 40 INJECTION, SUSPENSION INTRA-ARTICULAR; INTRAMUSCULAR at 10:03

## 2025-03-18 NOTE — PROGRESS NOTES
CC: RIGHT shoulder pain and stiffness    Patient is a 59-year-old male who presents today for follow-up evaluation of right shoulder pain.  He denies any new injuries or trauma to the right shoulder since his last visit.  Recently underwent an MRI of the right shoulder, and is here today to discuss diagnosis and treatment options moving forward.    HPI (3/11/2025):  Freedom presents with right shoulder pain ongoing for at least two months. He is unable to recall a specific incident or date when it began. The pain is not only in his shoulder but also in the area around his right ribcage/lat area. He has difficulty raising his right arm with limited range of motion compared to his left arm.    He has been receiving regular massages every couple of weeks, including cupping and shoulder work, but the condition has not improved. His massage therapist noted inflammation in the tissues around the ribcage. He has been taking natural supplements for inflammation, including Phyto Ultra Comfort, which contains turmeric, glucosamine, and chondroitin.    Freedom also reports neck pain, describing a loud crack when he releases tension while lying down. He is unsure if the pain is starting in the neck or radiating up from the shoulder.    He recently completed prostate cancer treatment in [place], [place], which included radiation therapy. During his final round of radiation, the shoulder pain was particularly severe. He considered seeing an orthopedic specialist in [place] but decided to wait until returning home.    Freedom mentions a history of frozen shoulder in his left shoulder a few years ago, which took months to resolve with physical therapy and cortisone injections. He expresses concern about the possibility of prostate cancer metastasis to the bones, though his radiation oncologist has advised that this is unlikely based on his other test results.    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    Freedom denies any numbness or tingling going down his arm, any pain in his back, and any acute injury to the shoulder. Freedom denies any history of diabetes or thyroid disease.    PREVIOUS TREATMENTS:  Freedom underwent treatment for left-sided frozen shoulder a few years ago, before the pandemic. This treatment included cortisone injections and physical therapy over several months, which led to significant improvement. For his current right shoulder issue, he receives massage therapy every couple of weeks, including cupping and shoulder work, but has experienced minimal benefit over time. Freedom also performs tension release exercises when lying down, resulting in loud cracks in his neck.    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         Is affecting ADLs.  Pain is 3/10 at it's worst.      Past Medical History:   Diagnosis Date    Gout, unspecified     Mixed hyperlipidemia     Unspecified astigmatism, unspecified eye     Unspecified sensorineural hearing loss        History reviewed. No pertinent surgical history.    Family History   Problem Relation Name Age of Onset    Stroke Mother      Hypertension Mother      Heart disease Mother      Hypertension Father      Cancer Father          Skin cancer    COPD Father      Deep vein thrombosis Maternal Grandfather      Kidney disease Paternal Grandmother      Alzheimer's disease Paternal Grandmother      Hypertension Paternal Grandfather      Heart disease Paternal Grandfather      Cancer Maternal Uncle Uncle- Maternal 64        Prostate and pancreatic    Cancer Maternal Uncle Uncle-Maternal         Bladder and Prostate    Cancer Maternal Aunt          Chronic leukemia    Cancer Maternal Aunt  66        Colorectal    Hypertension Paternal Uncle      Heart disease Paternal Uncle      Cirrhosis Paternal Uncle      Heart disease Paternal Uncle      Cancer Paternal  Uncle          Skin    Heart disease Paternal Aunt      Hypertension Paternal Aunt      Alzheimer's disease Paternal Aunt      Diabetes Paternal Aunt         Current Medications[1]    Review of patient's allergies indicates:  No Known Allergies       REVIEW OF SYSTEMS:  Constitution: Negative. Negative for chills, fever and night sweats.   HENT: Negative for congestion and headaches.    Eyes: Negative for blurred vision, left vision loss and right vision loss.   Cardiovascular: Negative for chest pain and syncope.   Respiratory: Negative for cough and shortness of breath.    Endocrine: Negative for polydipsia, polyphagia and polyuria.   Hematologic/Lymphatic: Negative for bleeding problem. Does not bruise/bleed easily.   Skin: Negative for dry skin, itching and rash.   Musculoskeletal: Negative for falls.  Positive for right shoulder pain and muscle weakness.   Gastrointestinal: Negative for abdominal pain and bowel incontinence.   Genitourinary: Negative for bladder incontinence and nocturia.   Neurological: Negative for disturbances in coordination, loss of balance and seizures.   Psychiatric/Behavioral: Negative for depression. The patient does not have insomnia.    Allergic/Immunologic: Negative for hives and persistent infections.      PHYSICAL EXAMINATION:  Vitals:  There were no vitals taken for this visit.   General: The patient is alert and oriented x 3.  Mood is pleasant.  Observation of ears, eyes and nose reveal no gross abnormalities.  No labored breathing observed.  Gait is coordinated. Patient can toe walk and heel walk without difficulty.      RIGHT SHOULDER / UPPER EXTREMITY EXAM    OBSERVATION:     Swelling  none  Deformity  none   Discoloration  none   Scapular winging none   Scars   none  Atrophy  none    TENDERNESS / CREPITUS (T/C):          T/C      T/C   Clavicle   -/-  SUPRAspinatus    -/-     AC Jt.    -/-  INFRAspinatus  -/-    SC Jt.    -/-  Deltoid    -/-      G. Tuberosity  -/-  LH BICEP  groove  -/-   Acromion:  -/-  Midline Neck   -/-     Scapular Spine -/-  Trapezium   -/-   SMA Scapula  -/-  GH jt. line - post  -/-     Scapulothoracic  -/-         ROM: (* = with pain)  Left shoulder   Right shoulder        AROM (PROM)   AROM (PROM)   FE    170° (175°)     120°* (125°*)     ER at 0°    60°  (65°)    45°*  (50°*)   ER at 90° ABD  90°  (90°)    50°*  (50°*)   IR at 90°  ABD   60  (60°)     40*  (40°*)      IR (spine level)   T10     Sacrum*    STRENGTH: (* = with pain) Left shoulder   Right shoulder   SCAPTION   5/5    4+/5*    IR    5/5    5/5   ER    5/5    4+/5*   BICEPS   5/5    5/5   Deltoid    5/5    4+/5     SIGNS:  Painful side       NEER   +   OARIKS  +    HARRY   +   SPEEDS  neg     DROP ARM   -   BELLY PRESS neg   Superior escape none    LIFT-OFF  neg   X-Body ADD    +   MOVING VALGUS neg        STABILITY TESTING    Left shoulder   Right shoulder    Translation     Anterior  up face     up face    Posterior  up face    up face    Sulcus   < 10mm    < 10 mm     Signs   Apprehension   neg      neg       Relocation   no change     no change      Jerk test  neg     neg    EXTREMITY NEURO-VASCULAR EXAM:    Sensation grossly intact to light touch all dermatomal regions.    DTR 2+ Biceps, Triceps, BR and Negative Aniyahs sign   Grossly intact motor function at Elbow, Wrist and Hand   Distal pulses radial and ulnar 2+, brisk cap refill, symmetric.      NECK:  Painless FROM and spinous processes non-tender. Negative Spurlings sign.      OTHER FINDINGS:  - scapular dyskinesia    XRAYS right shoulder (3/11/2025):  Xrays including AP, Outlet and Axillary Lateral of shoulder are ordered / images reviewed by me:  No acute fracture or dislocation.  Mild glenohumeral and moderate acromioclavicular joint osteoarthritis noted.  Soft tissues appear normal.    MRI right shoulder (03/14/2025):  Impression:     Subtle low-grade articular surface tear of the anterior fibers of the infraspinatus,  no tendon retraction.  Mild tendinosis of the supraspinatus and infraspinatus.     Mild AC joint osseous hypertrophy and edema.      ASSESSMENT:     Right shoulder pain/stiffness  Adhesive capsulitis of right shoulder      PLAN:      I made the decision to obtain old records of the patient including previous notes and imaging. I independently reviewed and interpreted the radiographs and/or MRIs today as well as prior imaging. Reviewed MRI and radiograph results with patient in detail.    We again discussed at length different treatment options including conservative vs surgical management. These include anti-inflammatories, acetaminophen, rest, ice, heat, formal physical therapy including strengthening and stretching exercises, home exercise programs, dry needling, corticosteroid injections, and finally surgical intervention.      Recommend conservative treatment at this time.  We will proceed with a right shoulder subacromial/glenohumeral corticosteroid injection today.  I will also place a referral to formal physical therapy to work on aggressive range of motion.    Prescription for Celebrex 100 mg to take 1-2 times daily as needed for pain provided today.  Advised patient to refrain from taking other anti-inflammatory medications while taking Celebrex, however may alternate with acetaminophen if needed.    Follow up in approximately 8 weeks for re-evaluation.      All questions were answered, patient will contact us for questions or concerns in the interim.      This note was generated with the assistance of a combination of ambient listening technology and medical dictation software. Verbal consent was obtained by the patient and accompanying visitor(s) for the recording of patient appointment to facilitate this note. I attest to having reviewed and edited the generated note for accuracy, though some syntax, spelling errors, or grammatic errors may persist. Please contact the author of this note for any  clarification.                 [1]   Current Outpatient Medications:     allopurinoL (ZYLOPRIM) 100 MG tablet, Take 100 mg by mouth., Disp: , Rfl:     cabergoline (DOSTINEX) 0.5 mg tablet, , Disp: , Rfl:     celecoxib (CELEBREX) 100 MG capsule, Take 1 capsule (100 mg total) by mouth 2 (two) times daily., Disp: 60 capsule, Rfl: 1    ciprofloxacin HCl (CIPRO) 500 MG tablet, , Disp: , Rfl:     finasteride (PROSCAR) 5 mg tablet, , Disp: , Rfl:     metFORMIN (GLUCOPHAGE) 500 MG tablet, , Disp: , Rfl:     tamsulosin (FLOMAX) 0.4 mg Cap, , Disp: , Rfl:

## 2025-03-18 NOTE — PROCEDURES
Large Joint Aspiration/Injection    Date/Time: 3/18/2025 10:00 AM    Performed by: Steve Cruz PA-C  Authorized by: Steve Cruz PA-C    Consent Done?:  Yes (Verbal)  Indications:  Pain  Site marked: the procedure site was marked    Timeout: prior to procedure the correct patient, procedure, and site was verified    Prep: patient was prepped and draped in usual sterile fashion    Local anesthesia used?: No      Details:  Needle Size:  22 G  Ultrasonic Guidance for needle placement?: No    Approach:  Posterior  Location:  Shoulder  Shoulder joint: R subacromial/glenohumeral.  Medications:  40 mg triamcinolone acetonide 40 mg/mL  Patient tolerance:  Patient tolerated the procedure well with no immediate complications    Injection Procedure  A time out was performed, including verification of patient ID, procedure, site and side, availability of information and equipment, review of safety issues, and agreement with consent, the procedure site was marked.    After time out was performed, the patient was prepped aseptically with povidone-iodine swabsticks. A diagnostic and therapeutic injection of 1:3cc Kenalog/Marcaine was given under sterile technique using a 22g x 1.5 needle from the Posterior  aspect of the right Subacromial/glenohumeral joint in the sitting position.      Freedom Carranza had no adverse reactions to the medication. Pain decreased. He was instructed to apply ice to the joint for 20 minutes and avoid strenuous activities for 24-36 hours following the injection. He was warned of possible blood sugar and/or blood pressure changes during that time. Following that time, he can resume regular activities.    He was reminded to call the clinic immediately for any adverse side effects as explained in clinic today.

## 2025-03-20 ENCOUNTER — CLINICAL SUPPORT (OUTPATIENT)
Dept: REHABILITATION | Facility: OTHER | Age: 60
End: 2025-03-20
Payer: OTHER GOVERNMENT

## 2025-03-20 DIAGNOSIS — M25.511 CHRONIC RIGHT SHOULDER PAIN: ICD-10-CM

## 2025-03-20 DIAGNOSIS — M25.611 SHOULDER STIFFNESS, RIGHT: ICD-10-CM

## 2025-03-20 DIAGNOSIS — M25.511 NONTRAUMATIC PAIN OF RIGHT SHOULDER: ICD-10-CM

## 2025-03-20 DIAGNOSIS — M75.01 ADHESIVE CAPSULITIS OF RIGHT SHOULDER: ICD-10-CM

## 2025-03-20 DIAGNOSIS — G89.29 CHRONIC RIGHT SHOULDER PAIN: ICD-10-CM

## 2025-03-20 DIAGNOSIS — M25.611 DECREASED ROM OF RIGHT SHOULDER: Primary | ICD-10-CM

## 2025-03-20 PROCEDURE — 97161 PT EVAL LOW COMPLEX 20 MIN: CPT | Mod: PN

## 2025-03-20 PROCEDURE — 97530 THERAPEUTIC ACTIVITIES: CPT | Mod: PN

## 2025-03-20 NOTE — PROGRESS NOTES
Outpatient Rehab    Physical Therapy Evaluation    Patient Name: Freedom Carranza  MRN: [id number]  YOB: 1965  Encounter Date: 3/20/2025    Therapy Diagnosis:   Encounter Diagnoses   Name Primary?    Chronic right shoulder pain     Shoulder stiffness, right     Adhesive capsulitis of right shoulder     Decreased ROM of right shoulder Yes    Nontraumatic pain of right shoulder      Physician: Steve Cruz PA-C    Physician Orders: Eval and Treat  Medical Diagnosis: Chronic right shoulder pain  Shoulder stiffness, right  Adhesive capsulitis of right shoulder    Visit # / Visits Authorized:  1 / 1  Insurance Authorization Period: 3/20/2025 to 6/18/2025  Date of Evaluation: 3/20/2025  Plan of Care Certification: 3/20/2025 to 6/12/2025     Time In: 1110   Time Out: 1215  Total Time: 65   Total Billable Time:  30    Intake Outcome Measure for FOTO Survey    Therapist reviewed FOTO scores for Freedom Carranza on 3/20/2025.   FOTO report - see Media section or FOTO account episode details.     Intake Score: 45 (DASH: 55.0)%    Precautions  Range of Motion Restrictions: ROM to tolerance    Standard, recent prostate Cancer tx    Subjective   History of Present Illness  Freedom is a 59 y.o. male who reports to physical therapy with a chief concern of Frozen shoulder and slight tear of RTC.     The patient reports a medical diagnosis of M25.511,G89.29 (ICD-10-CM) - Chronic right shoulder pain  M25.611 (ICD-10-CM) - Shoulder stiffness, right  M75.01 (ICD-10-CM) - Adhesive capsulitis of right shoulder.    Diagnostic tests related to this condition: MRI studies.   MRI Studies Details: 3/14: Impression:     Subtle low-grade articular surface tear of the anterior fibers of the infraspinatus, no tendon retraction.  Mild tendinosis of the supraspinatus and infraspinatus.     Mild AC joint osseous hypertrophy and edema.    Dominant Hand: Right  History of Present Condition/Illness: Right Frozen shoulder and slight tear  of rotator cuff (fraying of fibers). Sx started about 2-3 months. History of frozen shoulder on left. He received a cortisone injection on Tuesday which seems to have helped with reduced pain and improved range of motion. Since symptoms started a few months ago and prior to injection, pain seemed to be slowly getting worse but have now improved.  Falls in the last 12 months: no Burning, tingling, numbness: no Popping, clicking, locking, feeling of instability at the shoulder: popping but no associated pain. Fine motor skill deficits: no  Patient denies dizziness, headaches, blurry vision, nausea, vomiting, impaired sensations in groin and saddle region, changes in bowel/bladder, and unexplained changes in weight.     Activities of Daily Living  Social history was obtained from Patient and Partner.    General Prior Level of Function Comments: Independent  General Current Level of Function Comments: moving more things to the L side, limited ROM, ADL performance, dressing/bathing, reaching       Previously independent with activities of daily living? Yes     Currently independent with activities of daily living? Yes     Modified currently    Previously independent with instrumental activities of daily living? Yes     Currently independent with instrumental activities of daily living? Yes     Modified currently        Pain     Patient reports a current pain level of 2/10. Pain at best is reported as 2/10. Pain at worst is reported as 6/10.   Location: R shoulder (top of the shoulder and down the posterio-lateral aspct of the arm and into rib cage on the R  Clinical Progression (since onset): Improved  Pain Qualities: Sharp, Dull  Pain-Relieving Factors: Heat, Other (Comment), Massage  Other Pain-Relieving Factors: injection; epsom salt bath,  Pain-Aggravating Factors: Movement         Review of Systems  Patient reports: Cancer History  Additional Red Flag Details: Recently completed prostate cancer tx;      Treatment  History  Treatments  Previously Received Treatments: Yes  Previous Treatments: Massage  Currently Receiving Treatments: Yes  Current Treatments: Massage  Additional Treatment Details: Cortisone injection    Living Arrangements  Living Situation  Housing: Home independently  Living Arrangements: Spouse/significant other    Single story shotgun house; adapted activities including reaching with the left and moving a lot of activities over to the left if possible.       Employment  Patient reports: Does the patient's condition impact their ability to work?  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       Past Medical History/Physical Systems Review:   Freedom Carranza  has a past medical history of Gout, unspecified, Mixed hyperlipidemia, Unspecified astigmatism, unspecified eye, and Unspecified sensorineural hearing loss.    Freedom Carranza  has no past surgical history on file.    Freedom has a current medication list which includes the following prescription(s): allopurinol, cabergoline, celecoxib, ciprofloxacin hcl, finasteride, metformin, and tamsulosin.    Review of patient's allergies indicates:  No Known Allergies     Objective   Posture        Shoulders are Rounded. Bilateral scapulae are: Protracted              Shoulder Palpation  Right Shoulder Palpation  Unremarkable: Muscle, Bony Prominence/Bursa, and Tendon/Ligament          Left Shoulder Palpation  Unremarkable: Muscle, Bony Prominence/Bursa, and Tendon/Ligament              Subcranial Range of Motion   Active Restricted? Passive Restricted? Pain   Flexion         Protraction         Retraction           Cervical active range of motion appears normal and functional via observation but no measurements taken.     Shoulder Range of Motion  Right Shoulder   Active (deg) Passive (deg) Pain   Flexion 110 120 Yes   Extension 40       Scaption         ABduction 80 80 Yes   ADduction         Horizontal ABduction         Horizontal ADduction          External Rotation (Shoulder ABducted 0 degrees) 30   Yes   External Rotation (Shoulder ABducted 45 degrees) 10   Yes   External Rotation (Shoulder ABducted 90 degrees)         Internal Rotation (Shoulder ABducted 0 degrees)         Internal Rotation (Shoulder ABducted 45 degrees)         Internal Rotation (Shoulder ABducted 90 degrees)           Left Shoulder   Active (deg) Passive (deg) Pain   Flexion 155       Extension 60       Scaption         ABduction 160       ADduction         Horizontal ABduction         Horizontal ADduction         External Rotation (Shoulder ABducted 0 degrees) 60       External Rotation (Shoulder ABducted 45 degrees) 60       External Rotation (Shoulder ABducted 90 degrees)         Internal Rotation (Shoulder ABducted 0 degrees)         Internal Rotation (Shoulder ABducted 45 degrees)         Internal Rotation (Shoulder ABducted 90 degrees)             Shoulder, Elbow, or Forearm Range of Motion Details: Functional shoulder external rotation: right: C7 with pain; left: T2                                     Functional shoulder internal rotation: right: right lateral buttock with pain; left: TL junction         Shoulder Strength - Planes of Motion   Right Strength Right Pain Left Strength Left  Pain   Flexion 3+ (within available ROM) Yes 4+     Extension 4+ (within available ROM)   5     ABduction 3+ (within available ROM) Yes 4+     ADduction           Horizontal ABduction           Horizontal ADduction           Internal Rotation 0° 5 (within available ROM)   5     Internal Rotation 90°           External Rotation 0° 3+ (within available ROM) Yes 4     External Rotation 90°               Elbow Strength   Right Strength Right Pain Left Strength Left  Pain   Flexion (C6) 5   5     Extension (C7) 5   5                    Shoulder Joint Mobility  Right Shoulder Mobility  Hypomobile: Anterior Capsule Mobility, Posterior Capsule Mobility, and Inferior Capsule Mobility  Left Shoulder  Mobility  Normal: Anterior Capsule Mobility, Posterior Capsule Mobility, and Inferior Capsule Mobility       Right Scapular Mobility  Normal: Distraction  Left Scapular Mobility  Normal: Distraction                   Treatment:  Manual Therapy  MT 1: PROM R shoulder with end range stretching into flexion/ABD/ER/IR to tolerance  Therapeutic Activity  TA 1: HEP review, performance and education - Supine Shoulder External Rotation Stretch  - 1 x daily - 3-5 x weekly - 1 sets - 3 reps - 60 hold  - Seated Shoulder External Rotation PROM on Table  - 1 x daily - 1 sets - 3 reps - 30 seconds hold  - Seated Shoulder Flexion Towel Slide at Table Top  - 1 x daily - 1 sets - 3 reps - 60 hold  - Seated Shoulder Scaption Slide at Table Top with Forearm in Neutral  - 1 x daily - 1 sets - 3 reps - 60 hold  - Seated Shoulder Pendulum Exercise  - 1 x daily - 1 sets - 3 reps - 60 hold  - Standing Shoulder Flexion Wall Walk  - 1 x daily - 3-5 x weekly - 2 sets - 10 reps    Time Entry(in minutes):  PT Evaluation (Low) Time Entry: 35  Manual Therapy Time Entry: 5  Therapeutic Activity Time Entry: 25    Assessment & Plan   Assessment  Freedom            Functional Limitations: Pain when reaching, Driving, Disrupted sleep pattern, Completing self-care activities, Completing work/school activities, Carrying objects, Range of motion, Performing household chores, Participating in leisure activities, Pain with ADLs/IADLs, Reaching  Impairments: Abnormal or restricted range of motion, Impaired physical strength, Lack of appropriate home exercise program, Pain with functional activity    Patient Goal for Therapy (PT): alleviate frozen shoulder, return to painfree normal ROM  Prognosis: Good  Assessment Details:  Freedom is a 59 y.o. male referred to outpatient Physical Therapy with a medical diagnosis of  M25.511,G89.29 (ICD-10-CM) - Chronic right shoulder pain M25.611 (ICD-10-CM) - Shoulder stiffness, right M75.01 (ICD-10-CM) - Adhesive  capsulitis of right shoulder . Patient presents with signs and symptoms consistent with referring diagnosis including impairments in range of motion, strength, flexibility, poor posture and increased pain. These impairments are negatively affecting the patients performance of Activities of daily living and limiting participation with work, recreational and household duties.     Plan  From a physical therapy perspective, the patient would benefit from: Skilled Rehab Services    Planned therapy interventions include: Therapeutic exercise, Therapeutic activities, Neuromuscular re-education, Manual therapy, Gait training, and Other (Comment). dry needling  Planned modalities to include: Cryotherapy (cold pack), Electrical stimulation - passive/unattended, Thermotherapy (hot pack), and Ultrasound.        Visit Frequency: 1 times Per Week for 12 Weeks.       This plan was discussed with Patient and Family.   Discussion participants: Agreed Upon Plan of Care  Plan details: Patient will be seen by a physical therapist minimally every 6th visit or every 30 days.          Patient's spiritual, cultural, and educational needs considered and patient agreeable to plan of care and goals.     Education  Education was done with Patient and Other recipient present.  The patient Verbalizes understanding.  They identified as Spouse/significant other.  The recipient Verbalizes understanding.     Patient was educated on initial evaluation findings and expectations as well as future PT services, procedures, and expectations for optimal compliance with therapy. Home exercise program review, exercise form and purpose. Dry needling vs. Acupuncture; stretching and movements within tolerable ranges avoiding overstressing tissue. Timeframe for frozen shoulder and stages       Goals:   Active       1. short term goal        Patient will be independent with home exercise program to promote improved therapy outcomes.         Start:  03/20/25     Expected End:  05/01/25            Patient will require minimal Verbal cues from physical therapist for proper scapular retraction in order to improve postural awareness.        Start:  03/20/25    Expected End:  05/01/25            Patient will increase pain free passive range of motion of R shoulder flexion to >/= 150 deg and external rotation at 90 degrees abduction to 30 degrees to improve functional mobility of upper extremity.        Start:  03/20/25    Expected End:  05/01/25               2. long term goal        Patient will improve Focus On therapeutic Outcomes (FOTO) from 45 to 68 to demonstrate improved functional mobility.         Start:  03/20/25    Expected End:  06/12/25            Patient will increase R shoulder active range of motion to equal the left to improve functional use of right upper extremity for dressing and bathing.       Start:  03/20/25    Expected End:  06/12/25            Patient will  improve right shoulder Manual Muscle tests to = 4/5 to promote equal use of bilateral upper extremities in performing functional tasks.       Start:  03/20/25    Expected End:  06/12/25                Dalila Escobedo, PT

## 2025-03-20 NOTE — PATIENT INSTRUCTIONS
Access Code: [email or web address]  URL: [email or web address]  Date: 03/20/2025  Prepared by: Dalila Escobedo    Exercises  - Supine Shoulder External Rotation Stretch  - 1 x daily - 3-5 x weekly - 1 sets - 3 reps - 60 hold  - Seated Shoulder External Rotation PROM on Table  - 1 x daily - 1 sets - 3 reps - 30 seconds hold  - Seated Shoulder Flexion Towel Slide at Table Top  - 1 x daily - 1 sets - 3 reps - 60 hold  - Seated Shoulder Scaption Slide at Table Top with Forearm in Neutral  - 1 x daily - 1 sets - 3 reps - 60 hold  - Seated Shoulder Pendulum Exercise  - 1 x daily - 1 sets - 3 reps - 60 hold  - Standing Shoulder Flexion Wall Walk  - 1 x daily - 3-5 x weekly - 2 sets - 10 reps

## 2025-03-26 PROBLEM — M25.611 DECREASED ROM OF RIGHT SHOULDER: Status: ACTIVE | Noted: 2025-03-20

## 2025-03-26 PROBLEM — M25.511 NONTRAUMATIC PAIN OF RIGHT SHOULDER: Status: ACTIVE | Noted: 2025-03-20

## 2025-03-27 ENCOUNTER — CLINICAL SUPPORT (OUTPATIENT)
Dept: REHABILITATION | Facility: OTHER | Age: 60
End: 2025-03-27
Payer: OTHER GOVERNMENT

## 2025-03-27 DIAGNOSIS — M25.611 DECREASED ROM OF RIGHT SHOULDER: Primary | ICD-10-CM

## 2025-03-27 DIAGNOSIS — M25.511 NONTRAUMATIC PAIN OF RIGHT SHOULDER: ICD-10-CM

## 2025-03-27 PROCEDURE — 97140 MANUAL THERAPY 1/> REGIONS: CPT | Mod: PN

## 2025-03-27 PROCEDURE — 97110 THERAPEUTIC EXERCISES: CPT | Mod: PN

## 2025-03-27 PROCEDURE — 97112 NEUROMUSCULAR REEDUCATION: CPT | Mod: PN

## 2025-03-27 NOTE — PROGRESS NOTES
"Outpatient Rehab    Physical Therapy Visit    Patient Name: Freedom Carranza  MRN: [id number]  YOB: 1965  Encounter Date: 3/27/2025    Therapy Diagnosis:   Encounter Diagnoses   Name Primary?    Decreased ROM of right shoulder Yes    Nontraumatic pain of right shoulder      Physician: Steve Cruz PA-C    Physician Orders: Eval and Treat  Medical Diagnosis: Chronic right shoulder pain  Shoulder stiffness, right  Adhesive capsulitis of right shoulder    Visit # / Visits Authorized:  1 / 20  Insurance Authorization Period: 3/20/2025 to 3/20/2026  Date of Evaluation: 3/20/2025  Plan of Care Certification: 3/20/2025 to 6/12/2025   Progress Note Due: 4/20/2025  Focus On Therapeutic Outcomes: 2/5, 1. 3/20/2025     PT/PTA: PT   Number of PTA visits since last PT visit:0  Time In: 1500   Time Out: 1557  Total Time: 57   Total Billable Time: 57         Subjective   reports his worst R shoulder pain is 6/10 when he moves it, but he tries to not let the pain get that high..  Pain reported as 6/10. location: R shoulder    Objective            Treatment:  Therapeutic Exercise  TE 1: UBE 3' forward/3' reverse  TE 2: latissimus dorsi stretches with foam roll in // bars 10 x 10"  Manual Therapy  MT 1: PROM R shoulder with end range stretching into flexion/ABD/ER/IR to tolerance  MT 2: Grades I, II, and III inferior and posterior GH joint mobilizations.  MT 3: scapular mobnilizations  MT 4: IASTM and trigger point release R lstissimus dorsi, L pec, L infraspinatus  Balance/Neuromuscular Re-Education  NMR 1: Seated Shoulder Flexion Towel Slide at Table Top x 20 reps  Seated Shoulder Scaption Slide at Table Top with Forearm in Neutral 20 reps,  NMR 2: supine scapular protractions 2 x 10 reps  NMR 3: side lying R shoulder ER, R shoulder flex 2 x 10 reps each    Time Entry(in minutes):  Manual Therapy Time Entry: 25  Neuromuscular Re-Education Time Entry: 22  Therapeutic Exercise Time Entry: 10    Assessment & Plan "   Assessment: Tenderness in R shoulder in R supraspinatus, R latissimus dorsi and also R serratus anterior  Evaluation/Treatment Tolerance: Patient tolerated treatment well    Patient will continue to benefit from skilled outpatient physical therapy to address the deficits listed in the problem list box on initial evaluation, provide pt/family education and to maximize pt's level of independence in the home and community environment.     Patient's spiritual, cultural, and educational needs considered and patient agreeable to plan of care and goals.           Plan: Continue with manual therapy, periscapular strengthening to progress to goals.    Goals:   Active       1. short term goal        Patient will be independent with home exercise program to promote improved therapy outcomes.         Start:  03/20/25    Expected End:  05/01/25            Patient will require minimal Verbal cues from physical therapist for proper scapular retraction in order to improve postural awareness.        Start:  03/20/25    Expected End:  05/01/25            Patient will increase pain free passive range of motion of R shoulder flexion to >/= 150 deg and external rotation at 90 degrees abduction to 30 degrees to improve functional mobility of upper extremity.        Start:  03/20/25    Expected End:  05/01/25               2. long term goal        Patient will improve Focus On therapeutic Outcomes (FOTO) from 45 to 68 to demonstrate improved functional mobility.         Start:  03/20/25    Expected End:  06/12/25            Patient will increase R shoulder active range of motion to equal the left to improve functional use of right upper extremity for dressing and bathing.       Start:  03/20/25    Expected End:  06/12/25            Patient will  improve right shoulder Manual Muscle tests to = 4/5 to promote equal use of bilateral upper extremities in performing functional tasks.       Start:  03/20/25    Expected End:  06/12/25                 Que Rosa, PT

## 2025-04-03 ENCOUNTER — CLINICAL SUPPORT (OUTPATIENT)
Dept: REHABILITATION | Facility: OTHER | Age: 60
End: 2025-04-03
Payer: OTHER GOVERNMENT

## 2025-04-03 DIAGNOSIS — M25.611 DECREASED ROM OF RIGHT SHOULDER: Primary | ICD-10-CM

## 2025-04-03 DIAGNOSIS — M25.511 NONTRAUMATIC PAIN OF RIGHT SHOULDER: ICD-10-CM

## 2025-04-03 PROCEDURE — 97112 NEUROMUSCULAR REEDUCATION: CPT | Mod: PN

## 2025-04-03 PROCEDURE — 97110 THERAPEUTIC EXERCISES: CPT | Mod: PN

## 2025-04-03 PROCEDURE — 97140 MANUAL THERAPY 1/> REGIONS: CPT | Mod: PN

## 2025-04-03 NOTE — PROGRESS NOTES
"Outpatient Rehab    Physical Therapy Visit    Patient Name: Freedom Carranza  MRN: [id number]  YOB: 1965  Encounter Date: 4/3/2025    Therapy Diagnosis:   Encounter Diagnoses   Name Primary?    Decreased ROM of right shoulder Yes    Nontraumatic pain of right shoulder      Physician: Steve Cruz PA-C    Physician Orders: Eval and Treat  Medical Diagnosis: Chronic right shoulder pain  Shoulder stiffness, right  Adhesive capsulitis of right shoulder    Visit # / Visits Authorized:  2 / 20  Insurance Authorization Period: 3/27/2025 to 6/25/2025  Date of Evaluation: 3/20/2025  Plan of Care Certification: 3/20/2025 to 6/12/2025   Progress Note Due: 4/20/2025  Focus On Therapeutic Outcomes: 3/5, 1. 3/20/2025     PT/PTA:     Number of PTA visits since last PT visit:   Time In: 1601   Time Out: 1703  Total Time: 62   Total Billable Time: 62         Subjective   states his shoulder mostly feels the same, but does feel he can move it slightly more. also reporting R abdomen and R rib cage..  Pain reported as 0/10.      Objective          Treatment:  Therapeutic Exercise  TE 1: UBE 3' forward/3' reverse  TE 2: latissimus dorsi stretches with foam roll in // bars 10 x 10"  Manual Therapy  MT 1: PROM R shoulder with end range stretching into flexion/ABD/ER/IR to tolerance  MT 2: Grades I, II, and III inferior and posterior GH joint mobilizations.  MT 3: scapular mobilizations  Balance/Neuromuscular Re-Education  NMR 1: Seated Shoulder Flexion Towel Slide at Table Top x 20 reps  Seated Shoulder Scaption Slide at Table Top with Forearm in Neutral 20 reps,  NMR 2: supine scapular protractions 2 x 10 reps  NMR 3: side lying R shoulder ER, R shoulder flex 2 x 20 reps each      Time Entry(in minutes):  Manual Therapy Time Entry: 25  Neuromuscular Re-Education Time Entry: 27  Therapeutic Exercise Time Entry: 10    Assessment & Plan   Assessment: R latissimus dorsi tightness continues to limit R shoulder ROM. May benefit " from dry needling.       Patient will continue to benefit from skilled outpatient physical therapy to address the deficits listed in the problem list box on initial evaluation, provide pt/family education and to maximize pt's level of independence in the home and community environment.     Patient's spiritual, cultural, and educational needs considered and patient agreeable to plan of care and goals.           Plan: Continue with manual therapy, periscapular strengthening to progress to goals.    Goals:   Active       1. short term goal        Patient will be independent with home exercise program to promote improved therapy outcomes.         Start:  03/20/25    Expected End:  05/01/25            Patient will require minimal Verbal cues from physical therapist for proper scapular retraction in order to improve postural awareness.        Start:  03/20/25    Expected End:  05/01/25            Patient will increase pain free passive range of motion of R shoulder flexion to >/= 150 deg and external rotation at 90 degrees abduction to 30 degrees to improve functional mobility of upper extremity.        Start:  03/20/25    Expected End:  05/01/25               2. long term goal        Patient will improve Focus On therapeutic Outcomes (FOTO) from 45 to 68 to demonstrate improved functional mobility.         Start:  03/20/25    Expected End:  06/12/25            Patient will increase R shoulder active range of motion to equal the left to improve functional use of right upper extremity for dressing and bathing.       Start:  03/20/25    Expected End:  06/12/25            Patient will  improve right shoulder Manual Muscle tests to = 4/5 to promote equal use of bilateral upper extremities in performing functional tasks.       Start:  03/20/25    Expected End:  06/12/25                Que Rosa, PT

## 2025-04-10 ENCOUNTER — CLINICAL SUPPORT (OUTPATIENT)
Dept: REHABILITATION | Facility: OTHER | Age: 60
End: 2025-04-10
Payer: OTHER GOVERNMENT

## 2025-04-10 DIAGNOSIS — M25.611 DECREASED ROM OF RIGHT SHOULDER: Primary | ICD-10-CM

## 2025-04-10 DIAGNOSIS — M25.511 NONTRAUMATIC PAIN OF RIGHT SHOULDER: ICD-10-CM

## 2025-04-10 PROCEDURE — 97112 NEUROMUSCULAR REEDUCATION: CPT | Mod: PN

## 2025-04-10 PROCEDURE — 97530 THERAPEUTIC ACTIVITIES: CPT | Mod: PN

## 2025-04-10 PROCEDURE — 97110 THERAPEUTIC EXERCISES: CPT | Mod: PN

## 2025-04-10 NOTE — PROGRESS NOTES
"Outpatient Rehab    Physical Therapy Visit    Patient Name: Freedom Carranza  MRN: [id number]  YOB: 1965  Encounter Date: 4/10/2025    Therapy Diagnosis:   Encounter Diagnoses   Name Primary?    Decreased ROM of right shoulder Yes    Nontraumatic pain of right shoulder        Physician: Steve Cruz PA-C    Physician Orders: Eval and Treat  Medical Diagnosis: Chronic right shoulder pain  Shoulder stiffness, right  Adhesive capsulitis of right shoulder    Visit # / Visits Authorized:  3 / 20  Insurance Authorization Period: 3/27/2025 to 6/25/2025  Date of Evaluation: 3/20/2025  Plan of Care Certification: 3/20/2025 to 6/12/2025   Progress Note Due: 4/20/2025  Focus On Therapeutic Outcomes: 3/5, 1. 3/20/2025     PT/PTA: PT   Number of PTA visits since last PT visit:0  Time In: 1551   Time Out: 1656  Total Time: 65   Total Billable Time: 65         Subjective   reports his pain level is pretty much the same with smae motions provoking the pain, but he is noticing incremental improvement in the shoulder..  Pain reported as 0/10. location: R shoulder    Objective          Treatment:  Therapeutic Exercise  TE 1: UBE 3' forward/3' reverse  TE 2: latissimus dorsi stretches with foam roll in // bars 10 x 10"  Manual Therapy  MT 1: PROM R shoulder with end range stretching into flexion/ABD/ER/IR to tolerance  MT 2: Grades I, II, and III inferior and posterior GH joint mobilizations.  MT 3: scapular mobilizations  Balance/Neuromuscular Re-Education  NMR 1: Seated Shoulder Flexion Towel Slide at Table Top x 20 reps  Seated Shoulder Scaption Slide at Table Top with Forearm in Neutral 20 reps,  NMR 2: supine scapular protractions 2 x 10 reps  NMR 3: side lying R shoulder ER, R shoulder flex, shoulder horizontal abd, shoulder abd to 90 deg x 20 reps each      Time Entry(in minutes):  Manual Therapy Time Entry: 25  Neuromuscular Re-Education Time Entry: 30  Therapeutic Exercise Time Entry: 10    Assessment & Plan "   Assessment: Patient will benefit from continued OPPT to increase and maximize R shoulder ROM  Evaluation/Treatment Tolerance: Patient tolerated treatment well    Patient will continue to benefit from skilled outpatient physical therapy to address the deficits listed in the problem list box on initial evaluation, provide pt/family education and to maximize pt's level of independence in the home and community environment.     Patient's spiritual, cultural, and educational needs considered and patient agreeable to plan of care and goals.           Plan: Continue with manual therapy, periscapular strengthening to progress to goals.    Goals:   Active       1. short term goal        Patient will be independent with home exercise program to promote improved therapy outcomes.         Start:  03/20/25    Expected End:  05/01/25            Patient will require minimal Verbal cues from physical therapist for proper scapular retraction in order to improve postural awareness.        Start:  03/20/25    Expected End:  05/01/25            Patient will increase pain free passive range of motion of R shoulder flexion to >/= 150 deg and external rotation at 90 degrees abduction to 30 degrees to improve functional mobility of upper extremity.        Start:  03/20/25    Expected End:  05/01/25               2. long term goal        Patient will improve Focus On therapeutic Outcomes (FOTO) from 45 to 68 to demonstrate improved functional mobility.         Start:  03/20/25    Expected End:  06/12/25            Patient will increase R shoulder active range of motion to equal the left to improve functional use of right upper extremity for dressing and bathing.       Start:  03/20/25    Expected End:  06/12/25            Patient will  improve right shoulder Manual Muscle tests to = 4/5 to promote equal use of bilateral upper extremities in performing functional tasks.       Start:  03/20/25    Expected End:  06/12/25                 Que Rosa, PT

## 2025-04-17 ENCOUNTER — CLINICAL SUPPORT (OUTPATIENT)
Dept: REHABILITATION | Facility: OTHER | Age: 60
End: 2025-04-17
Payer: OTHER GOVERNMENT

## 2025-04-17 DIAGNOSIS — M25.611 DECREASED ROM OF RIGHT SHOULDER: Primary | ICD-10-CM

## 2025-04-17 DIAGNOSIS — M25.511 NONTRAUMATIC PAIN OF RIGHT SHOULDER: ICD-10-CM

## 2025-04-17 PROCEDURE — 97140 MANUAL THERAPY 1/> REGIONS: CPT | Mod: PN,CG

## 2025-04-17 PROCEDURE — 97530 THERAPEUTIC ACTIVITIES: CPT | Mod: PN

## 2025-04-17 PROCEDURE — 20560 NDL INSJ W/O NJX 1 OR 2 MUSC: CPT | Mod: PN

## 2025-04-17 NOTE — PATIENT INSTRUCTIONS
Reviewed benefits and risks for dry needling. Patient verbally agreed to dry needling and signed consent form. Discussed drinking water today for hydration following dry needling. Discussed benefit of heat versus ice if patient experiences needle site pain.

## 2025-04-17 NOTE — PROGRESS NOTES
Outpatient Rehab    Physical Therapy Progress Note    Patient Name: Freedom Carranza  MRN: [id number]  YOB: 1965  Encounter Date: 4/17/2025    Therapy Diagnosis:   Encounter Diagnoses   Name Primary?    Decreased ROM of right shoulder Yes    Nontraumatic pain of right shoulder      Physician: Steve Cruz PA-C    Physician Orders: Eval and Treat  Medical Diagnosis: Chronic right shoulder pain  Shoulder stiffness, right  Adhesive capsulitis of right shoulder    Visit # / Visits Authorized:  4 / 20  Insurance Authorization Period: 3/27/2025 to 6/25/2025  Date of Evaluation: 3/20/2025  Plan of Care Certification: 3/20/2025 to 6/12/2025   Progress Note Due: 5/20/2025  Focus On Therapeutic Outcomes: 1/5, 2. 4/20/2025     PT/PTA: PT   Number of PTA visits since last PT visit:0  Time In: 1257   Time Out: 1400  Total Time: 63   Total Billable Time: 63    FOTO:  Intake Score: 45%  Survey Score 1: 50%  Survey Score 2:  %         Subjective   denies pain at rest, but can have pain and end ranges in his shoulder.         Objective      Shoulder Range of Motion  Right Shoulder   Active (deg) Passive (deg) Pain   Flexion 110 130     Extension 55       Scaption         ABduction 80 110     ADduction         External Rotation (Shoulder ABducted 0 degrees) 45       External Rotation (Shoulder ABducted 45 degrees) 45   Yes   External Rotation (Shoulder ABducted 90 degrees) 40       Internal Rotation (Shoulder ABducted 45 degrees) 32         Left Shoulder   Active (deg) Passive (deg) Pain   Flexion 140       Extension 70       ABduction 160       External Rotation (Shoulder ABducted 0 degrees) 60       External Rotation (Shoulder ABducted 45 degrees) 60       Internal Rotation (Shoulder ABducted 45 degrees) 65       Internal Rotation (Shoulder ABducted 90 degrees)             Shoulder Strength - Planes of Motion   Right Strength Right Pain Left Strength Left  Pain   Flexion 5 (within available ROM)    5     Extension 5    5     ABduction 5 (within available ROM)    5     Internal Rotation 0° 5 (within available ROM)    5     External Rotation 0° 4 (within available ROM)  Yes 4          Elbow Strength   Right Strength Right Pain Left Strength Left  Pain   Flexion (C6) 5   5     Extension (C7) 5   5          Following dry needling:  Right shoulder active range of motion flexion: 115 degrees   Right shoulder active range of motion abduction: 100 degrees   Right shoulder active range of motion external rotation at 45 degrees : 50 degrees   Right shoulder active range of motion internal rotation at 45 degrees : 50 degrees     Treatment:  Therapeutic Exercise  TE 1: UBE 3' forward/3' reverse followind dry needling  Manual Therapy  MT 5: Application of TDN: Pt educated on benefits and potential side effects of dry needling. Educated pt on benefits, precautions, side effects following TDN. Educated pt to use heat following treatment sessions if pt is experiencing pain or soreness. Pt verbalized good understanding of education.  Pt signed written consent to dry needling. Pt gave verbal consent for DN    Pt received dry needling to R pectoralis @ fanning technique and in situ) and R latissimus dorsi (2 needles) muscles using 40mm needles.  Therapeutic Activity  TA 1: Reassessment and FOTO    Time Entry(in minutes):  Manual Therapy Time Entry: 20  Therapeutic Activity Time Entry: 37  Therapeutic Exercise Time Entry: 6    Assessment & Plan   Assessment: Initiated dry needling today.Good soft tissue response to dry needling evident by increased grasp with unilateral winding at all insertion points. Winding performed every 5 minutes during treatment. No adverse effects following treatment. Improved R shoulder ROM following dry needling. Jamar add posterior shoulder needle sites next visit. WIll benefit from continued OPPT.  Evaluation/Treatment Tolerance: Patient tolerated treatment well    Patient will continue to benefit from skilled outpatient  physical therapy to address the deficits listed in the problem list box on initial evaluation, provide pt/family education and to maximize pt's level of independence in the home and community environment.     Patient's spiritual, cultural, and educational needs considered and patient agreeable to plan of care and goals.           Plan: Continue with dry needling, manual therapy, periscapular strengthening to progress to goals.    Goals:   Active       1. short term goal        Patient will be independent with home exercise program to promote improved therapy outcomes.   (Met)       Start:  03/20/25    Expected End:  05/01/25    Resolved:  04/17/25         Patient will require minimal Verbal cues from physical therapist for proper scapular retraction in order to improve postural awareness.  (Met)       Start:  03/20/25    Expected End:  05/01/25    Resolved:  04/17/25         Patient will increase pain free passive range of motion of R shoulder flexion to >/= 150 deg and external rotation at 90 degrees abduction to 30 degrees to improve functional mobility of upper extremity.  (Progressing)       Start:  03/20/25    Expected End:  05/01/25               2. long term goal        Patient will improve Focus On therapeutic Outcomes (FOTO) from 45 to 68 to demonstrate improved functional mobility.   (Progressing)       Start:  03/20/25    Expected End:  06/12/25            Patient will increase R shoulder active range of motion to equal the left to improve functional use of right upper extremity for dressing and bathing. (Progressing)       Start:  03/20/25    Expected End:  06/12/25            Patient will  improve right shoulder Manual Muscle tests to = 4/5 to promote equal use of bilateral upper extremities in performing functional tasks. (Progressing)       Start:  03/20/25    Expected End:  06/12/25                Que Rosa, PT

## 2025-04-24 ENCOUNTER — CLINICAL SUPPORT (OUTPATIENT)
Dept: REHABILITATION | Facility: OTHER | Age: 60
End: 2025-04-24
Payer: OTHER GOVERNMENT

## 2025-04-24 DIAGNOSIS — M25.511 NONTRAUMATIC PAIN OF RIGHT SHOULDER: ICD-10-CM

## 2025-04-24 DIAGNOSIS — M25.611 DECREASED ROM OF RIGHT SHOULDER: Primary | ICD-10-CM

## 2025-04-24 PROCEDURE — 97112 NEUROMUSCULAR REEDUCATION: CPT | Mod: PN

## 2025-04-24 PROCEDURE — 97140 MANUAL THERAPY 1/> REGIONS: CPT | Mod: PN

## 2025-04-24 PROCEDURE — 97014 ELECTRIC STIMULATION THERAPY: CPT | Mod: PN

## 2025-04-24 NOTE — PROGRESS NOTES
Outpatient Rehab    Physical Therapy Progress Note    Patient Name: Freedom Carranza  MRN: [id number]  YOB: 1965  Encounter Date: 4/24/2025    Therapy Diagnosis:   Encounter Diagnoses   Name Primary?    Decreased ROM of right shoulder Yes    Nontraumatic pain of right shoulder        Physician: Steve Cruz PA-C    Physician Orders: Eval and Treat  Medical Diagnosis: Chronic right shoulder pain  Shoulder stiffness, right  Adhesive capsulitis of right shoulder    Visit # / Visits Authorized:  5 / 20  Insurance Authorization Period: 3/27/2025 to 6/25/2025  Date of Evaluation: 3/20/2025  Plan of Care Certification: 3/20/2025 to 6/12/2025   Progress Note Due: 5/20/2025  Focus On Therapeutic Outcomes: 1/5, 2. 4/20/2025     PT/PTA: PT   Number of PTA visits since last PT visit:0  Time In: 1600   Time Out: 1700  Total Time: 60   Total Billable Time: 30    FOTO:  Intake Score:  %  Survey Score 1:  %  Survey Score 2:  %          Subjective   Feels like the needling is really helping. He feels like he is looser and can move his arm more.  Pain reported as 0/10. location: R shoulder    Objective        Treatment:  Therapeutic Exercise  TE 1: UBE 3' forward/3' reverse followind dry needling  TE 3: doorway ER and flexion stretching 10x 10 second holds  Manual Therapy  MT 5: Application of TDN: Pt educated on benefits and potential side effects of dry needling. Educated pt on benefits, precautions, side effects following TDN. Educated pt to use heat following treatment sessions if pt is experiencing pain or soreness. Pt verbalized good understanding of education.  Pt signed written consent to dry needling. Pt gave verbal consent for DN    Pt received dry needling to R pectoralis (fanning technique) and R latissimus dorsi (2 needles) muscles using 40mm needles.    30 mm needles: Teres major x2; Teres minor x2; infraspinatus x2 with E-stim applied through 3 channels at 2 Hz and 2-6 mA to  tolerance.  Balance/Neuromuscular Re-Education  NMR 1: Wall walks flexion/scaption to tolerance 2x 10    Time Entry(in minutes):  E-Stim (Unattended) Time Entry: 10  Manual Therapy Time Entry: 25  Neuromuscular Re-Education Time Entry: 20  Therapeutic Exercise Time Entry: 10    Assessment & Plan   Assessment: Continued with dry needling. Improved R shoulder ROM noted post treatment. Initiated e-stim without adverse effects. Patient demonstrated appropriate response to Functional Dry Needling.  Good soft tissue response to dry needling evident by increased grasp with unilateral winding at all insertion points. Winding performed every 5 minutes during treatment. No adverse effects following treatment.  good rhythmical contractions observed with estim to treated muscle groups.     Monitor response to Functional Dry Needling. Continue with Functional Dry Needling in POC as appropriate.  Evaluation/Treatment Tolerance: Patient tolerated treatment well    Patient will continue to benefit from skilled outpatient physical therapy to address the deficits listed in the problem list box on initial evaluation, provide pt/family education and to maximize pt's level of independence in the home and community environment.     Patient's spiritual, cultural, and educational needs considered and patient agreeable to plan of care and goals.           Plan: Continue with dry needling, manual therapy, periscapular strengthening to progress to goals.    Goals:   Active       1. short term goal        Patient will be independent with home exercise program to promote improved therapy outcomes.   (Met)       Start:  03/20/25    Expected End:  05/01/25    Resolved:  04/17/25         Patient will require minimal Verbal cues from physical therapist for proper scapular retraction in order to improve postural awareness.  (Met)       Start:  03/20/25    Expected End:  05/01/25    Resolved:  04/17/25         Patient will increase pain free passive  range of motion of R shoulder flexion to >/= 150 deg and external rotation at 90 degrees abduction to 30 degrees to improve functional mobility of upper extremity.  (Progressing)       Start:  03/20/25    Expected End:  05/01/25               2. long term goal        Patient will improve Focus On therapeutic Outcomes (FOTO) from 45 to 68 to demonstrate improved functional mobility.   (Progressing)       Start:  03/20/25    Expected End:  06/12/25            Patient will increase R shoulder active range of motion to equal the left to improve functional use of right upper extremity for dressing and bathing. (Progressing)       Start:  03/20/25    Expected End:  06/12/25            Patient will  improve right shoulder Manual Muscle tests to = 4/5 to promote equal use of bilateral upper extremities in performing functional tasks. (Progressing)       Start:  03/20/25    Expected End:  06/12/25                Dalila Escobedo, PT

## 2025-05-01 ENCOUNTER — CLINICAL SUPPORT (OUTPATIENT)
Dept: REHABILITATION | Facility: OTHER | Age: 60
End: 2025-05-01
Payer: OTHER GOVERNMENT

## 2025-05-01 DIAGNOSIS — M25.611 DECREASED ROM OF RIGHT SHOULDER: Primary | ICD-10-CM

## 2025-05-01 DIAGNOSIS — M25.511 NONTRAUMATIC PAIN OF RIGHT SHOULDER: ICD-10-CM

## 2025-05-01 PROCEDURE — 97110 THERAPEUTIC EXERCISES: CPT | Mod: PN

## 2025-05-01 PROCEDURE — 97140 MANUAL THERAPY 1/> REGIONS: CPT | Mod: PN

## 2025-05-01 PROCEDURE — 97112 NEUROMUSCULAR REEDUCATION: CPT | Mod: PN

## 2025-05-01 NOTE — PROGRESS NOTES
Outpatient Rehab    Physical Therapy Visit    Patient Name: Freedom Carranza  MRN: [id number]  YOB: 1965  Encounter Date: 5/1/2025    Therapy Diagnosis:   Encounter Diagnoses   Name Primary?    Decreased ROM of right shoulder Yes    Nontraumatic pain of right shoulder      Physician: Steve Cruz PA-C    Physician Orders: Eval and Treat  Medical Diagnosis: Chronic right shoulder pain  Shoulder stiffness, right  Adhesive capsulitis of right shoulder    Visit # / Visits Authorized:  6 / 20  Insurance Authorization Period: 3/27/2025 to 6/25/2025  Date of Evaluation: 3/20/2025  Plan of Care Certification: 3/20/2025 to 6/12/2025   Progress Note Due: 5/20/2025  Focus On Therapeutic Outcomes: 3/5, 2. 4/20/2025     PT/PTA: PT   Number of PTA visits since last PT visit:0  Time In: 1550   Time Out: 1645  Total Time: 55   Total Billable Time: 54    FOTO:  Intake Score:  %  Survey Score 1:  %  Survey Score 2:  %         Subjective   He felt okay after last session. Feels like he is slowly improving. He had a massage yesterday so is feeling okay today..  Pain reported as 5/10. (with movement) location: R shoulder    Objective            Treatment:  Therapeutic Exercise  TE 1: UBE 3' forward/3' reverse followind dry needling  TE 3: doorway ER and flexion stretching 10x 10 second holds  TE 4: doorway Pec stretch 3x 30 seconds  Manual Therapy  MT 5: Application of TDN: Pt educated on benefits and potential side effects of dry needling. Educated pt on benefits, precautions, side effects following TDN. Educated pt to use heat following treatment sessions if pt is experiencing pain or soreness. Pt verbalized good understanding of education.  Pt signed written consent to dry needling. Pt gave verbal consent for DN    Pt received dry needling to R pectoralis (fanning technique) and R latissimus dorsi (2 needles) muscles using 40mm needles.    30 mm needles: Teres major x2; Teres minor x2; infraspinatus x2 with E-stim  applied through 3 channels at 2 Hz and 4-6 mA to tolerance.  Balance/Neuromuscular Re-Education  NMR 1: Wall walks flexion/scaption to tolerance 2x 10    Time Entry(in minutes):  Manual Therapy Time Entry: 25  Neuromuscular Re-Education Time Entry: 14  Therapeutic Exercise Time Entry: 15    Assessment & Plan   Assessment: Continued with dry needling with improved ROM post session. Good muscle twitch noted with e-stim today. Continue with dry needling as tolerated and monitor effects/response.  Evaluation/Treatment Tolerance: Patient tolerated treatment well    Patient will continue to benefit from skilled outpatient physical therapy to address the deficits listed in the problem list box on initial evaluation, provide pt/family education and to maximize pt's level of independence in the home and community environment.     Patient's spiritual, cultural, and educational needs considered and patient agreeable to plan of care and goals.           Plan: Continue with dry needling, manual therapy, periscapular strengthening to progress to goals.    Goals:   Active       1. short term goal        Patient will be independent with home exercise program to promote improved therapy outcomes.   (Met)       Start:  03/20/25    Expected End:  05/01/25    Resolved:  04/17/25         Patient will require minimal Verbal cues from physical therapist for proper scapular retraction in order to improve postural awareness.  (Met)       Start:  03/20/25    Expected End:  05/01/25    Resolved:  04/17/25         Patient will increase pain free passive range of motion of R shoulder flexion to >/= 150 deg and external rotation at 90 degrees abduction to 30 degrees to improve functional mobility of upper extremity.  (Progressing)       Start:  03/20/25    Expected End:  05/01/25               2. long term goal        Patient will improve Focus On therapeutic Outcomes (FOTO) from 45 to 68 to demonstrate improved functional mobility.    (Progressing)       Start:  03/20/25    Expected End:  06/12/25            Patient will increase R shoulder active range of motion to equal the left to improve functional use of right upper extremity for dressing and bathing. (Progressing)       Start:  03/20/25    Expected End:  06/12/25            Patient will  improve right shoulder Manual Muscle tests to = 4/5 to promote equal use of bilateral upper extremities in performing functional tasks. (Progressing)       Start:  03/20/25    Expected End:  06/12/25                Dalila Escobedo PT

## 2025-05-06 ENCOUNTER — PATIENT MESSAGE (OUTPATIENT)
Dept: RHEUMATOLOGY | Facility: CLINIC | Age: 60
End: 2025-05-06
Payer: OTHER GOVERNMENT

## 2025-05-06 DIAGNOSIS — M10.9 GOUT: Primary | ICD-10-CM

## 2025-05-08 ENCOUNTER — CLINICAL SUPPORT (OUTPATIENT)
Dept: REHABILITATION | Facility: OTHER | Age: 60
End: 2025-05-08
Payer: OTHER GOVERNMENT

## 2025-05-08 DIAGNOSIS — M25.511 NONTRAUMATIC PAIN OF RIGHT SHOULDER: ICD-10-CM

## 2025-05-08 DIAGNOSIS — M25.611 DECREASED ROM OF RIGHT SHOULDER: Primary | ICD-10-CM

## 2025-05-08 PROCEDURE — 97140 MANUAL THERAPY 1/> REGIONS: CPT | Mod: PN,CG

## 2025-05-08 PROCEDURE — 97112 NEUROMUSCULAR REEDUCATION: CPT | Mod: PN

## 2025-05-08 PROCEDURE — 20561 NDL INSJ W/O NJX 3+ MUSC: CPT | Mod: PN

## 2025-05-08 NOTE — PROGRESS NOTES
Outpatient Rehab    Physical Therapy Visit    Patient Name: Freedom Carranza  MRN: [id number]  YOB: 1965  Encounter Date: 5/8/2025    Therapy Diagnosis:   Encounter Diagnoses   Name Primary?    Decreased ROM of right shoulder Yes    Nontraumatic pain of right shoulder        Physician: Steve Cruz PA-C    Physician Orders: Eval and Treat  Medical Diagnosis: Chronic right shoulder pain  Shoulder stiffness, right  Adhesive capsulitis of right shoulder    Visit # / Visits Authorized:  7 / 20  Insurance Authorization Period: 3/27/2025 to 6/25/2025  Date of Evaluation: 3/20/2025  Plan of Care Certification: 3/20/2025 to 6/12/2025   Progress Note Due: 5/20/2025  Focus On Therapeutic Outcomes: 4/5, 2. 4/20/2025     PT/PTA: PT   Number of PTA visits since last PT visit:0  Time In: 1558   Time Out: 1659  Total Time: 61   Total Billable Time: 61         Subjective   feels the dry needling is beneficial.  Pain reported as 5/10. location: R shoulder    Objective            Treatment:  Therapeutic Exercise  TE 1: UBE 3' forward/3' reverse followind dry needling  Manual Therapy  MT 5: Application of TDN: Pt educated on benefits and potential side effects of dry needling. Educated pt on benefits, precautions, side effects following TDN. Educated pt to use heat following treatment sessions if pt is experiencing pain or soreness. Pt verbalized good understanding of education.  Pt signed written consent to dry needling. Pt gave verbal consent for DN    Pt received dry needling to R pectoralis (fanning technique) and R latissimus dorsi (2 needles) muscles using 40mm needles.    30 mm needles: Teres major x2; Teres minor x2; infraspinatus x2 with E-stim applied through 3 channels at 2 Hz and 4-6 mA to tolerance. 40 mm to L rhomboids and middle trapezius  Balance/Neuromuscular Re-Education  NMR 1: Wall walks flexion/scaption to tolerance 2x 10    Time Entry(in minutes):  Manual Therapy Time Entry: 41  Neuromuscular  Re-Education Time Entry: 14  Therapeutic Exercise Time Entry: 6    Assessment & Plan   Assessment: Added dry needling sites to R rhomboids and middle trapezius. Good response with new neddle site.  Evaluation/Treatment Tolerance: Patient tolerated treatment well    Patient will continue to benefit from skilled outpatient physical therapy to address the deficits listed in the problem list box on initial evaluation, provide pt/family education and to maximize pt's level of independence in the home and community environment.     Patient's spiritual, cultural, and educational needs considered and patient agreeable to plan of care and goals.           Plan: Continue with dry needling, manual therapy, periscapular strengthening to progress to goals.    Goals:   Active       1. short term goal        Patient will be independent with home exercise program to promote improved therapy outcomes.   (Met)       Start:  03/20/25    Expected End:  05/01/25    Resolved:  04/17/25         Patient will require minimal Verbal cues from physical therapist for proper scapular retraction in order to improve postural awareness.  (Met)       Start:  03/20/25    Expected End:  05/01/25    Resolved:  04/17/25         Patient will increase pain free passive range of motion of R shoulder flexion to >/= 150 deg and external rotation at 90 degrees abduction to 30 degrees to improve functional mobility of upper extremity.  (Progressing)       Start:  03/20/25    Expected End:  05/01/25               2. long term goal        Patient will improve Focus On therapeutic Outcomes (FOTO) from 45 to 68 to demonstrate improved functional mobility.   (Progressing)       Start:  03/20/25    Expected End:  06/12/25            Patient will increase R shoulder active range of motion to equal the left to improve functional use of right upper extremity for dressing and bathing. (Progressing)       Start:  03/20/25    Expected End:  06/12/25            Patient  will  improve right shoulder Manual Muscle tests to = 4/5 to promote equal use of bilateral upper extremities in performing functional tasks. (Progressing)       Start:  03/20/25    Expected End:  06/12/25                Que Rosa, PT

## 2025-05-15 ENCOUNTER — CLINICAL SUPPORT (OUTPATIENT)
Dept: REHABILITATION | Facility: OTHER | Age: 60
End: 2025-05-15
Payer: OTHER GOVERNMENT

## 2025-05-15 DIAGNOSIS — M25.611 DECREASED ROM OF RIGHT SHOULDER: Primary | ICD-10-CM

## 2025-05-15 DIAGNOSIS — M25.511 NONTRAUMATIC PAIN OF RIGHT SHOULDER: ICD-10-CM

## 2025-05-15 PROCEDURE — 97112 NEUROMUSCULAR REEDUCATION: CPT | Mod: PN

## 2025-05-15 PROCEDURE — 97140 MANUAL THERAPY 1/> REGIONS: CPT | Mod: PN

## 2025-05-15 PROCEDURE — 97110 THERAPEUTIC EXERCISES: CPT | Mod: PN

## 2025-05-15 NOTE — PROGRESS NOTES
Outpatient Rehab    Physical Therapy Visit    Patient Name: Freedom Carranza  MRN: [id number]  YOB: 1965  Encounter Date: 5/15/2025    Therapy Diagnosis:   Encounter Diagnoses   Name Primary?    Decreased ROM of right shoulder Yes    Nontraumatic pain of right shoulder          Physician: Steve Cruz PA-C    Physician Orders: Eval and Treat  Medical Diagnosis: Chronic right shoulder pain  Shoulder stiffness, right  Adhesive capsulitis of right shoulder    Visit # / Visits Authorized:  8 / 20  Insurance Authorization Period: 3/27/2025 to 6/25/2025  Date of Evaluation: 3/20/2025  Plan of Care Certification: 3/20/2025 to 6/12/2025   Progress Note Due: 5/20/2025  Focus On Therapeutic Outcomes: next visit     PT/PTA: PT   Number of PTA visits since last PT visit:   Time In: 1655   Time Out: 1754  Total Time: 59   Total Billable Time: 59         Subjective   states he had a good massage last Saturday. thinks everything combined is helping. 0/10 pain at rest. when he raises the arm gets pain in mid deltoid.  Pain reported as 5/10. location: R shoulder    Objective            Treatment:  Therapeutic Exercise  TE 1: UBE 3' forward/3' reverse followind dry needling  TE 4: doorway Pec stretch 3x 30 seconds  Manual Therapy  MT 5: Application of TDN: Pt educated on benefits and potential side effects of dry needling. Educated pt on benefits, precautions, side effects following TDN. Educated pt to use heat following treatment sessions if pt is experiencing pain or soreness. Pt verbalized good understanding of education.  Pt signed written consent to dry needling. Pt gave verbal consent for DN    Pt received dry needling to R pectoralis (fanning technique) and R latissimus dorsi (2 needles) muscles using 40mm needles.    30 mm needles: Upper trap, Teres major x2; Teres minor x2; infraspinatus x2 with E-stim applied through 3 channels at 2 Hz and 4-6 mA to tolerance. 40 mm to L rhomboids and middle  trapezius  Balance/Neuromuscular Re-Education  NMR 1: Wall walks flexion/scaption to tolerance 2x 10  NMR 4: serratus wall slides with yellow band x 20 reps, shoulder IR with YTB x 20 reps (increase next visit)    Time Entry(in minutes):  Manual Therapy Time Entry: 30  Neuromuscular Re-Education Time Entry: 20  Therapeutic Exercise Time Entry: 9    Assessment & Plan   Assessment: Continued with dry needling combined with electrical stimulation. Good response noted by patient following last session. Added serratus strengthening today with resistance.  Evaluation/Treatment Tolerance: Patient tolerated treatment well    Patient will continue to benefit from skilled outpatient physical therapy to address the deficits listed in the problem list box on initial evaluation, provide pt/family education and to maximize pt's level of independence in the home and community environment.     Patient's spiritual, cultural, and educational needs considered and patient agreeable to plan of care and goals.           Plan: Continue with dry needling, manual therapy, periscapular strengthening to progress to goals.    Goals:   Active       1. short term goal        Patient will be independent with home exercise program to promote improved therapy outcomes.   (Met)       Start:  03/20/25    Expected End:  05/01/25    Resolved:  04/17/25         Patient will require minimal Verbal cues from physical therapist for proper scapular retraction in order to improve postural awareness.  (Met)       Start:  03/20/25    Expected End:  05/01/25    Resolved:  04/17/25         Patient will increase pain free passive range of motion of R shoulder flexion to >/= 150 deg and external rotation at 90 degrees abduction to 30 degrees to improve functional mobility of upper extremity.  (Progressing)       Start:  03/20/25    Expected End:  05/01/25               2. long term goal        Patient will improve Focus On therapeutic Outcomes (FOTO) from 45 to 68  to demonstrate improved functional mobility.   (Progressing)       Start:  03/20/25    Expected End:  06/12/25            Patient will increase R shoulder active range of motion to equal the left to improve functional use of right upper extremity for dressing and bathing. (Progressing)       Start:  03/20/25    Expected End:  06/12/25            Patient will  improve right shoulder Manual Muscle tests to = 4/5 to promote equal use of bilateral upper extremities in performing functional tasks. (Progressing)       Start:  03/20/25    Expected End:  06/12/25                Que Rosa, PT

## 2025-05-22 ENCOUNTER — CLINICAL SUPPORT (OUTPATIENT)
Dept: REHABILITATION | Facility: OTHER | Age: 60
End: 2025-05-22
Payer: OTHER GOVERNMENT

## 2025-05-22 DIAGNOSIS — M25.511 NONTRAUMATIC PAIN OF RIGHT SHOULDER: ICD-10-CM

## 2025-05-22 DIAGNOSIS — M25.611 DECREASED ROM OF RIGHT SHOULDER: Primary | ICD-10-CM

## 2025-05-22 PROCEDURE — 97530 THERAPEUTIC ACTIVITIES: CPT | Mod: PN

## 2025-05-22 PROCEDURE — 20561 NDL INSJ W/O NJX 3+ MUSC: CPT | Mod: PN

## 2025-05-22 PROCEDURE — 97140 MANUAL THERAPY 1/> REGIONS: CPT | Mod: PN,CG

## 2025-05-22 NOTE — PROGRESS NOTES
Outpatient Rehab    Physical Therapy Progress Note    Patient Name: Freedom Carranza  MRN: [id number]  YOB: 1965  Encounter Date: 5/22/2025    Therapy Diagnosis:   Encounter Diagnoses   Name Primary?    Decreased ROM of right shoulder Yes    Nontraumatic pain of right shoulder      Physician: Steve Cruz PA-C    Physician Orders: Eval and Treat  Medical Diagnosis: Chronic right shoulder pain  Shoulder stiffness, right  Adhesive capsulitis of right shoulder    Visit # / Visits Authorized: 10 (9 / 20)  Insurance Authorization Period: 3/27/2025 to 6/25/2025  Date of Evaluation: 3/18/2025  Plan of Care Certification: 3/20/2025 to 6/12/2025   Focus On Therapeutic Outcomes: 1/5, 3. 5/23/2025     PT/PTA: PT   Number of PTA visits since last PT visit:0  Time In: 1600   Time Out: 1655  Total Time (in minutes): 55   Total Billable Time (in minutes): 55    FOTO:  Intake Score: 45%  Survey Score 2: 50%  Survey Score 3: 56%    Precautions: Standard       Subjective   states his shoulder is incrementally better. Has his follow up with the PA tomorrow morning. will discuss possible steroid injection. Otherwise, he is not taking any pain medication..  Pain reported as 4/10. location: R shoulder    Objective      Shoulder Range of Motion  Right Shoulder   Active (deg) Passive (deg) Pain   Flexion 125 145     Extension 60       Scaption         ABduction 105 130     ADduction         Horizontal ABduction         Horizontal ADduction         External Rotation (Shoulder ABducted 0 degrees) 50       External Rotation (Shoulder ABducted 45 degrees) 55       External Rotation (Shoulder ABducted 90 degrees) 50       Internal Rotation (Shoulder ABducted 0 degrees)         Internal Rotation (Shoulder ABducted 45 degrees)         Internal Rotation (Shoulder ABducted 90 degrees)                          Treatment:  Therapeutic Exercise  TE 1: UBE 3' forward/3' reverse followind dry needling  Manual Therapy  MT 4: stm and trigger  point release R latissimus dorsi, R pec , and R infraspinatus  MT 5: Application of TDN: Pt educated on benefits and potential side effects of dry needling. Educated pt on benefits, precautions, side effects following TDN. Educated pt to use heat following treatment sessions if pt is experiencing pain or soreness. Pt verbalized good understanding of education.  Pt signed written consent to dry needling. Pt gave verbal consent for DN    Pt received dry needling to R pectoralis (fanning technique) and R latissimus dorsi (2 needles) muscles using 40mm needles.    30 mm needles: Upper trap, Teres major x2; Teres minor x2; Supraspinatus, infraspinatus x2 with E-stim applied through 3 channels at 2 Hz and 4-6 mA to tolerance. 40 mm to L rhomboids and middle trapezius  Therapeutic Activity  TA 1: Reassessment and FOTO    Time Entry(in minutes):  Manual Therapy Time Entry: 34  Therapeutic Activity Time Entry: 15  Therapeutic Exercise Time Entry: 6    Assessment & Plan   Assessment: Patient progressing with increased R shoulder ROM. Discussed IR stretch with towel at home.       The patient will continue to benefit from skilled outpatient physical therapy in order to address the deficits listed in the problem list on the initial evaluation, provide patient and family education, and maximize the patients level of independence in the home and community environments.     The patient's spiritual, cultural, and educational needs were considered, and the patient is agreeable to the plan of care and goals.           Plan: Continue with dry needling, manual therapy, periscapular strengthening to progress to goals. Moved next Thursday's appointment to the following week as next week is his last week at work.    Goals:   Active       1. short term goal        Patient will be independent with home exercise program to promote improved therapy outcomes.   (Met)       Start:  03/20/25    Expected End:  05/01/25    Resolved:  04/17/25          Patient will require minimal Verbal cues from physical therapist for proper scapular retraction in order to improve postural awareness.  (Met)       Start:  03/20/25    Expected End:  05/01/25    Resolved:  04/17/25         Patient will increase pain free passive range of motion of R shoulder flexion to >/= 150 deg and external rotation at 90 degrees abduction to 30 degrees to improve functional mobility of upper extremity.  (Progressing)       Start:  03/20/25    Expected End:  05/01/25               2. long term goal        Patient will improve Focus On therapeutic Outcomes (FOTO) from 45 to 68 to demonstrate improved functional mobility.   (Progressing)       Start:  03/20/25    Expected End:  06/12/25            Patient will increase R shoulder active range of motion to equal the left to improve functional use of right upper extremity for dressing and bathing. (Progressing)       Start:  03/20/25    Expected End:  06/12/25            Patient will  improve right shoulder Manual Muscle tests to = 4/5 to promote equal use of bilateral upper extremities in performing functional tasks. (Progressing)       Start:  03/20/25    Expected End:  06/12/25                Que Rosa, PT

## 2025-05-23 ENCOUNTER — OFFICE VISIT (OUTPATIENT)
Dept: SPORTS MEDICINE | Facility: CLINIC | Age: 60
End: 2025-05-23
Payer: OTHER GOVERNMENT

## 2025-05-23 VITALS
HEIGHT: 72 IN | SYSTOLIC BLOOD PRESSURE: 102 MMHG | BODY MASS INDEX: 26.74 KG/M2 | DIASTOLIC BLOOD PRESSURE: 66 MMHG | HEART RATE: 65 BPM | WEIGHT: 197.44 LBS

## 2025-05-23 DIAGNOSIS — M25.511 CHRONIC RIGHT SHOULDER PAIN: Primary | ICD-10-CM

## 2025-05-23 DIAGNOSIS — M75.01 ADHESIVE CAPSULITIS OF RIGHT SHOULDER: ICD-10-CM

## 2025-05-23 DIAGNOSIS — G89.29 CHRONIC RIGHT SHOULDER PAIN: Primary | ICD-10-CM

## 2025-05-23 DIAGNOSIS — M25.611 SHOULDER STIFFNESS, RIGHT: ICD-10-CM

## 2025-05-23 PROCEDURE — 99999 PR PBB SHADOW E&M-EST. PATIENT-LVL III: CPT | Mod: PBBFAC,,, | Performed by: PHYSICIAN ASSISTANT

## 2025-05-23 PROCEDURE — 99213 OFFICE O/P EST LOW 20 MIN: CPT | Mod: PBBFAC | Performed by: PHYSICIAN ASSISTANT

## 2025-05-23 NOTE — PROGRESS NOTES
CC: RIGHT shoulder pain and stiffness    Patient is a 60-year-old male who presents today for follow-up evaluation of right shoulder pain/adhesive capsulitis.  He denies any new injuries or trauma to the right shoulder since his last visit.  He continues formal physical therapy and a regular home exercise program and feels that he is gradually making progress from a range-of-motion standpoint.  He does still have pain at terminal range of motion, particularly with internal and external rotation, but little to no pain at rest.  He reports significant relief from his most recent corticosteroid injection.  No longer taking Celebrex as he feels this is not needed.    HPI (3/11/2025):  Freedom presents with right shoulder pain ongoing for at least two months. He is unable to recall a specific incident or date when it began. The pain is not only in his shoulder but also in the area around his right ribcage/lat area. He has difficulty raising his right arm with limited range of motion compared to his left arm.    He has been receiving regular massages every couple of weeks, including cupping and shoulder work, but the condition has not improved. His massage therapist noted inflammation in the tissues around the ribcage. He has been taking natural supplements for inflammation, including Phyto Ultra Comfort, which contains turmeric, glucosamine, and chondroitin.    Freedom also reports neck pain, describing a loud crack when he releases tension while lying down. He is unsure if the pain is starting in the neck or radiating up from the shoulder.    He recently completed prostate cancer treatment in [place], [place], which included radiation therapy. During his final round of radiation, the shoulder pain was particularly severe. He considered seeing an orthopedic specialist in [place] but decided to wait until returning home.    Freedom mentions a history of frozen shoulder in his left shoulder a few years ago, which took  months to resolve with physical therapy and cortisone injections. He expresses concern about the possibility of prostate cancer metastasis to the bones, though his radiation oncologist has advised that this is unlikely based on his other test results.    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    Freedom denies any numbness or tingling going down his arm, any pain in his back, and any acute injury to the shoulder. Freedom denies any history of diabetes or thyroid disease.    PREVIOUS TREATMENTS:  Freedom underwent treatment for left-sided frozen shoulder a few years ago, before the pandemic. This treatment included cortisone injections and physical therapy over several months, which led to significant improvement. For his current right shoulder issue, he receives massage therapy every couple of weeks, including cupping and shoulder work, but has experienced minimal benefit over time. Freedom also performs tension release exercises when lying down, resulting in loud cracks in his neck.    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         Is affecting ADLs.  Pain is 3/10 at it's worst.      Past Medical History:   Diagnosis Date    Gout, unspecified     Mixed hyperlipidemia     Unspecified astigmatism, unspecified eye     Unspecified sensorineural hearing loss        History reviewed. No pertinent surgical history.    Family History   Problem Relation Name Age of Onset    Stroke Mother      Hypertension Mother      Heart disease Mother      Hypertension Father      Cancer Father          Skin cancer    COPD Father      Deep vein thrombosis Maternal Grandfather      Kidney disease Paternal Grandmother      Alzheimer's disease Paternal Grandmother      Hypertension Paternal Grandfather      Heart disease Paternal Grandfather      Cancer Maternal Uncle Uncle- Maternal 64        Prostate and  "pancreatic    Cancer Maternal Uncle Uncle-Maternal         Bladder and Prostate    Cancer Maternal Aunt          Chronic leukemia    Cancer Maternal Aunt  66        Colorectal    Hypertension Paternal Uncle      Heart disease Paternal Uncle      Cirrhosis Paternal Uncle      Heart disease Paternal Uncle      Cancer Paternal Uncle          Skin    Heart disease Paternal Aunt      Hypertension Paternal Aunt      Alzheimer's disease Paternal Aunt      Diabetes Paternal Aunt         Current Medications[1]    Review of patient's allergies indicates:  No Known Allergies       REVIEW OF SYSTEMS:  Constitution: Negative. Negative for chills, fever and night sweats.   HENT: Negative for congestion and headaches.    Eyes: Negative for blurred vision, left vision loss and right vision loss.   Cardiovascular: Negative for chest pain and syncope.   Respiratory: Negative for cough and shortness of breath.    Endocrine: Negative for polydipsia, polyphagia and polyuria.   Hematologic/Lymphatic: Negative for bleeding problem. Does not bruise/bleed easily.   Skin: Negative for dry skin, itching and rash.   Musculoskeletal: Negative for falls.  Positive for right shoulder pain and muscle weakness.   Gastrointestinal: Negative for abdominal pain and bowel incontinence.   Genitourinary: Negative for bladder incontinence and nocturia.   Neurological: Negative for disturbances in coordination, loss of balance and seizures.   Psychiatric/Behavioral: Negative for depression. The patient does not have insomnia.    Allergic/Immunologic: Negative for hives and persistent infections.      PHYSICAL EXAMINATION:  Vitals:  /66 (BP Location: Right arm, Patient Position: Sitting)   Pulse 65   Ht 5' 11.5" (1.816 m)   Wt 89.5 kg (197 lb 6.8 oz)   BMI 27.15 kg/m²    General: The patient is alert and oriented x 3.  Mood is pleasant.  Observation of ears, eyes and nose reveal no gross abnormalities.  No labored breathing observed.  Gait is " coordinated. Patient can toe walk and heel walk without difficulty.      RIGHT SHOULDER / UPPER EXTREMITY EXAM    OBSERVATION:     Swelling  none  Deformity  none   Discoloration  none   Scapular winging none   Scars   none  Atrophy  none    TENDERNESS / CREPITUS (T/C):          T/C      T/C   Clavicle   -/-  SUPRAspinatus    -/-     AC Jt.    -/-  INFRAspinatus  -/-    SC Jt.    -/-  Deltoid    -/-      G. Tuberosity  -/-  LH BICEP groove  -/-   Acromion:  -/-  Midline Neck   -/-     Scapular Spine -/-  Trapezium   -/-   SMA Scapula  -/-  GH jt. line - post  -/-     Scapulothoracic  -/-         ROM: (* = with pain)  Left shoulder   Right shoulder        AROM (PROM)   AROM (PROM)   FE    170° (175°)     125°* (135°*)     ER at 0°    60°  (65°)    45°*  (50°*)   ER at 90° ABD  90°  (90°)    50°*  (50°*)   IR at 90°  ABD   60  (60°)     40*  (40°*)      IR (spine level)   T10     Sacrum*    STRENGTH: (* = with pain) Left shoulder   Right shoulder   SCAPTION   5/5    4+/5*    IR    5/5    5/5   ER    5/5    4+/5*   BICEPS   5/5    5/5   Deltoid    5/5    4+/5     SIGNS:  Painful side       NEER   +   OARIKS  +    HARRY   +   SPEEDS  neg     DROP ARM   -   BELLY PRESS neg   Superior escape none    LIFT-OFF  neg   X-Body ADD    +   MOVING VALGUS neg        STABILITY TESTING    Left shoulder   Right shoulder    Translation     Anterior  up face     up face    Posterior  up face    up face    Sulcus   < 10mm    < 10 mm     Signs   Apprehension   neg      neg       Relocation   no change     no change      Jerk test  neg     neg    EXTREMITY NEURO-VASCULAR EXAM:    Sensation grossly intact to light touch all dermatomal regions.    DTR 2+ Biceps, Triceps, BR and Negative Aniyahs sign   Grossly intact motor function at Elbow, Wrist and Hand   Distal pulses radial and ulnar 2+, brisk cap refill, symmetric.      NECK:  Painless FROM and spinous processes non-tender. Negative Spurlings sign.      OTHER FINDINGS:  -  scapular dyskinesia    XRAYS right shoulder (3/11/2025):  Xrays including AP, Outlet and Axillary Lateral of shoulder are ordered / images reviewed by me:  No acute fracture or dislocation.  Mild glenohumeral and moderate acromioclavicular joint osteoarthritis noted.  Soft tissues appear normal.    MRI right shoulder (03/14/2025):  Impression:     Subtle low-grade articular surface tear of the anterior fibers of the infraspinatus, no tendon retraction.  Mild tendinosis of the supraspinatus and infraspinatus.     Mild AC joint osseous hypertrophy and edema.      ASSESSMENT:     Right shoulder pain/stiffness  Adhesive capsulitis of right shoulder      PLAN:      Prior imaging reviewed and discussed with patient in detail.    We again discussed at length different treatment options including conservative vs surgical management. These include anti-inflammatories, acetaminophen, rest, ice, heat, formal physical therapy including strengthening and stretching exercises, home exercise programs, dry needling, corticosteroid injections, and finally surgical intervention.      Recommend continued conservative treatment at this time.  Continue formal physical therapy and regular home exercise program.  Recommend over-the-counter or prescription anti-inflammatories/acetaminophen as needed for pain.    Follow up in approximately 1 month for re-evaluation and possible repeat corticosteroid injection.      All questions were answered, patient will contact us for questions or concerns in the interim.      This note was generated with the assistance of a combination of ambient listening technology and medical dictation software. Verbal consent was obtained by the patient and accompanying visitor(s) for the recording of patient appointment to facilitate this note. I attest to having reviewed and edited the generated note for accuracy, though some syntax, spelling errors, or grammatic errors may persist. Please contact the author of  this note for any clarification.                   [1]   Current Outpatient Medications:     allopurinoL (ZYLOPRIM) 100 MG tablet, Take 100 mg by mouth., Disp: , Rfl:     cabergoline (DOSTINEX) 0.5 mg tablet, , Disp: , Rfl:     celecoxib (CELEBREX) 100 MG capsule, Take 1 capsule (100 mg total) by mouth 2 (two) times daily., Disp: 60 capsule, Rfl: 1    finasteride (PROSCAR) 5 mg tablet, , Disp: , Rfl:     metFORMIN (GLUCOPHAGE) 500 MG tablet, , Disp: , Rfl:     tamsulosin (FLOMAX) 0.4 mg Cap, , Disp: , Rfl:     ciprofloxacin HCl (CIPRO) 500 MG tablet, , Disp: , Rfl:

## 2025-06-03 ENCOUNTER — CLINICAL SUPPORT (OUTPATIENT)
Dept: REHABILITATION | Facility: OTHER | Age: 60
End: 2025-06-03
Payer: OTHER GOVERNMENT

## 2025-06-03 DIAGNOSIS — M25.511 NONTRAUMATIC PAIN OF RIGHT SHOULDER: ICD-10-CM

## 2025-06-03 DIAGNOSIS — M25.611 DECREASED ROM OF RIGHT SHOULDER: Primary | ICD-10-CM

## 2025-06-03 PROCEDURE — 97110 THERAPEUTIC EXERCISES: CPT | Mod: PN

## 2025-06-03 PROCEDURE — 97112 NEUROMUSCULAR REEDUCATION: CPT | Mod: PN

## 2025-06-03 PROCEDURE — 97140 MANUAL THERAPY 1/> REGIONS: CPT | Mod: PN

## 2025-06-05 ENCOUNTER — CLINICAL SUPPORT (OUTPATIENT)
Dept: REHABILITATION | Facility: OTHER | Age: 60
End: 2025-06-05
Payer: OTHER GOVERNMENT

## 2025-06-05 DIAGNOSIS — M25.511 NONTRAUMATIC PAIN OF RIGHT SHOULDER: ICD-10-CM

## 2025-06-05 DIAGNOSIS — M25.611 DECREASED ROM OF RIGHT SHOULDER: Primary | ICD-10-CM

## 2025-06-05 PROCEDURE — 97140 MANUAL THERAPY 1/> REGIONS: CPT | Mod: PN

## 2025-06-05 PROCEDURE — 97110 THERAPEUTIC EXERCISES: CPT | Mod: PN

## 2025-06-05 PROCEDURE — 97112 NEUROMUSCULAR REEDUCATION: CPT | Mod: PN

## 2025-06-05 PROCEDURE — 97014 ELECTRIC STIMULATION THERAPY: CPT | Mod: PN

## 2025-06-11 ENCOUNTER — CLINICAL SUPPORT (OUTPATIENT)
Dept: REHABILITATION | Facility: OTHER | Age: 60
End: 2025-06-11
Payer: OTHER GOVERNMENT

## 2025-06-11 DIAGNOSIS — M25.611 DECREASED ROM OF RIGHT SHOULDER: Primary | ICD-10-CM

## 2025-06-11 DIAGNOSIS — M25.511 NONTRAUMATIC PAIN OF RIGHT SHOULDER: ICD-10-CM

## 2025-06-11 PROCEDURE — 97140 MANUAL THERAPY 1/> REGIONS: CPT | Mod: PN

## 2025-06-11 NOTE — PROGRESS NOTES
Outpatient Rehab    Physical Therapy Visit    Patient Name: Freedom Carranza  MRN: [id number]  YOB: 1965  Encounter Date: 6/11/2025    Therapy Diagnosis:   Encounter Diagnoses   Name Primary?    Decreased ROM of right shoulder Yes    Nontraumatic pain of right shoulder      Physician: Steve Cruz PA-C    Physician Orders: Eval and Treat  Medical Diagnosis: Chronic right shoulder pain  Shoulder stiffness, right  Adhesive capsulitis of right shoulder  Surgical Diagnosis: Not applicable for this Episode   Surgical Date: Not applicable for this Episode    Visit # / Visits Authorized: 13 (12 / 20)  Insurance Authorization Period: 3/27/2025 to 6/25/2025  Date of Evaluation: 3/18/2025  Plan of Care Certification: 3/20/2025 to 6/12/2025      PT/PTA: PT   Number of PTA visits since last PT visit:0  Time In: 0959   Time Out: 1100  Total Time (in minutes): 61   Total Billable Time (in minutes): 41    FOTO:  Intake Score: 45%  Survey Score 2: 50%  Survey Score 3: 56%    Precautions: Standard       Subjective   reports feeling stiff this morning. states he bruised after last needling session and that was the first christin..    location: R shoulder    Objective            Treatment:  Manual Therapy  MT 5: Application of TDN: Pt educated on benefits and potential side effects of dry needling. Educated pt on benefits, precautions, side effects following TDN. Educated pt to use heat following treatment sessions if pt is experiencing pain or soreness. Pt verbalized good understanding of education.  Pt signed written consent to dry needling. Pt gave verbal consent for DN    Pt received dry needling to R pectoralis (fanning technique) and R latissimus dorsi (2 needles) muscles using 40mm needles.    30 mm needles: Teres major x2; Teres minor x2; infraspinatus x2 with E-stim applied through 3 channels at 2 Hz and 4-6 mA to tolerance.      Time Entry(in minutes):  Manual Therapy Time Entry: 35  Neuromuscular Re-Education Time  Entry: 6    Assessment & Plan   Assessment: INcreased pain and stiffness noted by patient today. Asked to defer reassessment until next visit. Will reassess next visit.  Evaluation/Treatment Tolerance: Patient tolerated treatment well    The patient will continue to benefit from skilled outpatient physical therapy in order to address the deficits listed in the problem list on the initial evaluation, provide patient and family education, and maximize the patients level of independence in the home and community environments.     The patient's spiritual, cultural, and educational needs were considered, and the patient is agreeable to the plan of care and goals.           Plan: Continue with dry needling, manual therapy, periscapular strengthening to progress to goals. Re-assessment and UPOC at next visit.    Goals:   Active       1. short term goal        Patient will be independent with home exercise program to promote improved therapy outcomes.   (Met)       Start:  03/20/25    Expected End:  05/01/25    Resolved:  04/17/25         Patient will require minimal Verbal cues from physical therapist for proper scapular retraction in order to improve postural awareness.  (Met)       Start:  03/20/25    Expected End:  05/01/25    Resolved:  04/17/25         Patient will increase pain free passive range of motion of R shoulder flexion to >/= 150 deg and external rotation at 90 degrees abduction to 30 degrees to improve functional mobility of upper extremity.  (Progressing)       Start:  03/20/25    Expected End:  05/01/25               2. long term goal        Patient will improve Focus On therapeutic Outcomes (FOTO) from 45 to 68 to demonstrate improved functional mobility.   (Progressing)       Start:  03/20/25    Expected End:  06/12/25            Patient will increase R shoulder active range of motion to equal the left to improve functional use of right upper extremity for dressing and bathing. (Progressing)        Start:  03/20/25    Expected End:  06/12/25            Patient will  improve right shoulder Manual Muscle tests to = 4/5 to promote equal use of bilateral upper extremities in performing functional tasks. (Progressing)       Start:  03/20/25    Expected End:  06/12/25                Que Rosa, PT

## 2025-06-16 ENCOUNTER — HOSPITAL ENCOUNTER (EMERGENCY)
Facility: OTHER | Age: 60
Discharge: HOME | End: 2025-06-16
Attending: EMERGENCY MEDICINE
Payer: OTHER GOVERNMENT

## 2025-06-16 VITALS
WEIGHT: 190 LBS | BODY MASS INDEX: 25.73 KG/M2 | HEART RATE: 61 BPM | HEIGHT: 72 IN | SYSTOLIC BLOOD PRESSURE: 130 MMHG | DIASTOLIC BLOOD PRESSURE: 73 MMHG | OXYGEN SATURATION: 100 % | TEMPERATURE: 98 F | RESPIRATION RATE: 20 BRPM

## 2025-06-16 DIAGNOSIS — K29.70 GASTRITIS, PRESENCE OF BLEEDING UNSPECIFIED, UNSPECIFIED CHRONICITY, UNSPECIFIED GASTRITIS TYPE: ICD-10-CM

## 2025-06-16 DIAGNOSIS — R10.13 EPIGASTRIC PAIN: ICD-10-CM

## 2025-06-16 DIAGNOSIS — R14.0 BLOATING: Primary | ICD-10-CM

## 2025-06-16 LAB
ABSOLUTE EOSINOPHIL (OHS): 0.05 K/UL
ABSOLUTE MONOCYTE (OHS): 0.25 K/UL (ref 0.3–1)
ABSOLUTE NEUTROPHIL COUNT (OHS): 3.89 K/UL (ref 1.8–7.7)
ALBUMIN SERPL BCP-MCNC: 4.4 G/DL (ref 3.5–5.2)
ALP SERPL-CCNC: 54 UNIT/L (ref 40–150)
ALT SERPL W/O P-5'-P-CCNC: 21 UNIT/L (ref 10–44)
ANION GAP (OHS): 11 MMOL/L (ref 8–16)
AST SERPL-CCNC: 26 UNIT/L (ref 11–45)
BASOPHILS # BLD AUTO: 0.02 K/UL
BASOPHILS NFR BLD AUTO: 0.4 %
BILIRUB SERPL-MCNC: 0.4 MG/DL (ref 0.1–1)
BUN SERPL-MCNC: 21 MG/DL (ref 8–23)
CALCIUM SERPL-MCNC: 10 MG/DL (ref 8.7–10.5)
CHLORIDE SERPL-SCNC: 105 MMOL/L (ref 95–110)
CO2 SERPL-SCNC: 26 MMOL/L (ref 23–29)
CREAT SERPL-MCNC: 1 MG/DL (ref 0.5–1.4)
ERYTHROCYTE [DISTWIDTH] IN BLOOD BY AUTOMATED COUNT: 12.4 % (ref 11.5–14.5)
GFR SERPLBLD CREATININE-BSD FMLA CKD-EPI: >60 ML/MIN/1.73/M2
GLUCOSE SERPL-MCNC: 105 MG/DL (ref 70–110)
HCT VFR BLD AUTO: 40.5 % (ref 40–54)
HGB BLD-MCNC: 13.1 GM/DL (ref 14–18)
IMM GRANULOCYTES # BLD AUTO: 0.02 K/UL (ref 0–0.04)
IMM GRANULOCYTES NFR BLD AUTO: 0.4 % (ref 0–0.5)
LIPASE SERPL-CCNC: 49 U/L (ref 4–60)
LYMPHOCYTES # BLD AUTO: 0.58 K/UL (ref 1–4.8)
MCH RBC QN AUTO: 31.6 PG (ref 27–31)
MCHC RBC AUTO-ENTMCNC: 32.3 G/DL (ref 32–36)
MCV RBC AUTO: 98 FL (ref 82–98)
NUCLEATED RBC (/100WBC) (OHS): 0 /100 WBC
PLATELET # BLD AUTO: 162 K/UL (ref 150–450)
PMV BLD AUTO: 9.9 FL (ref 9.2–12.9)
POTASSIUM SERPL-SCNC: 5.6 MMOL/L (ref 3.5–5.1)
PROT SERPL-MCNC: 7.6 GM/DL (ref 6–8.4)
RBC # BLD AUTO: 4.14 M/UL (ref 4.6–6.2)
RELATIVE EOSINOPHIL (OHS): 1 %
RELATIVE LYMPHOCYTE (OHS): 12.1 % (ref 18–48)
RELATIVE MONOCYTE (OHS): 5.2 % (ref 4–15)
RELATIVE NEUTROPHIL (OHS): 80.9 % (ref 38–73)
SODIUM SERPL-SCNC: 142 MMOL/L (ref 136–145)
WBC # BLD AUTO: 4.81 K/UL (ref 3.9–12.7)

## 2025-06-16 PROCEDURE — 96361 HYDRATE IV INFUSION ADD-ON: CPT

## 2025-06-16 PROCEDURE — 99285 EMERGENCY DEPT VISIT HI MDM: CPT | Mod: 25

## 2025-06-16 PROCEDURE — 84075 ASSAY ALKALINE PHOSPHATASE: CPT

## 2025-06-16 PROCEDURE — 63600175 PHARM REV CODE 636 W HCPCS: Mod: JZ,TB | Performed by: EMERGENCY MEDICINE

## 2025-06-16 PROCEDURE — 83690 ASSAY OF LIPASE: CPT

## 2025-06-16 PROCEDURE — 85025 COMPLETE CBC W/AUTO DIFF WBC: CPT

## 2025-06-16 PROCEDURE — 25500020 PHARM REV CODE 255: Performed by: EMERGENCY MEDICINE

## 2025-06-16 PROCEDURE — 25000003 PHARM REV CODE 250: Performed by: EMERGENCY MEDICINE

## 2025-06-16 PROCEDURE — 96374 THER/PROPH/DIAG INJ IV PUSH: CPT

## 2025-06-16 RX ORDER — LIDOCAINE HYDROCHLORIDE 20 MG/ML
15 SOLUTION OROPHARYNGEAL ONCE
Status: COMPLETED | OUTPATIENT
Start: 2025-06-16 | End: 2025-06-16

## 2025-06-16 RX ORDER — KETOROLAC TROMETHAMINE 30 MG/ML
10 INJECTION, SOLUTION INTRAMUSCULAR; INTRAVENOUS
Status: COMPLETED | OUTPATIENT
Start: 2025-06-16 | End: 2025-06-16

## 2025-06-16 RX ORDER — ALUMINUM HYDROXIDE, MAGNESIUM HYDROXIDE, AND SIMETHICONE 1200; 120; 1200 MG/30ML; MG/30ML; MG/30ML
30 SUSPENSION ORAL ONCE
Status: COMPLETED | OUTPATIENT
Start: 2025-06-16 | End: 2025-06-16

## 2025-06-16 RX ORDER — SYRING-NEEDL,DISP,INSUL,0.3 ML 29 G X1/2"
296 SYRINGE, EMPTY DISPOSABLE MISCELLANEOUS ONCE
Qty: 296 ML | Refills: 0 | Status: SHIPPED | OUTPATIENT
Start: 2025-06-16 | End: 2025-06-16

## 2025-06-16 RX ORDER — OMEPRAZOLE 20 MG/1
20 CAPSULE, DELAYED RELEASE ORAL DAILY
Qty: 90 CAPSULE | Refills: 3 | Status: SHIPPED | OUTPATIENT
Start: 2025-06-16 | End: 2026-06-16

## 2025-06-16 RX ADMIN — LIDOCAINE HYDROCHLORIDE 15 ML: 20 SOLUTION ORAL at 04:06

## 2025-06-16 RX ADMIN — ALUMINUM HYDROXIDE, MAGNESIUM HYDROXIDE, AND DIMETHICONE 30 ML: 200; 20; 200 SUSPENSION ORAL at 04:06

## 2025-06-16 RX ADMIN — SODIUM CHLORIDE 500 ML: 9 INJECTION, SOLUTION INTRAVENOUS at 03:06

## 2025-06-16 RX ADMIN — IOHEXOL 100 ML: 350 INJECTION, SOLUTION INTRAVENOUS at 02:06

## 2025-06-16 RX ADMIN — KETOROLAC TROMETHAMINE 10 MG: 30 INJECTION, SOLUTION INTRAMUSCULAR at 03:06

## 2025-06-16 NOTE — FIRST PROVIDER EVALUATION
Medical screening examination initiated.  I have conducted a focused provider triage encounter, findings are as follows:    Brief history of present illness:  Reporting LLQ abdominal pain that began around 10 am this morning and is starting to worsen. No fever, nausea, vomiting, diarrhea, constipation or blood in stool. No urinary symptoms. Has not taken any medication for his pain. Hx of prostate cancer las year but now in remission.     There were no vitals filed for this visit.    Pertinent physical exam: Heart and lungs CTA. Moist mm. Unable to perform abdominal exam.     Brief workup plan: Labs.     Preliminary workup initiated; this workup will be continued and followed by the physician or advanced practice provider that is assigned to the patient when roomed.

## 2025-06-16 NOTE — DISCHARGE INSTRUCTIONS
Mr. Carranza,    Thank you for letting me care for you today! It was nice meeting you, and I hope you feel better soon.   If you would like access to your chart and what was done today please utilize the [facility].   Please come back to [facility] for all of your future medical needs.    Our goal in the emergency department is to always give you outstanding care and exceptional service. You may receive a survey by mail or e-mail in the next week regarding your experience in our ED. We would greatly appreciate you completing and returning the survey. Your feedback provides us with a way to recognize our staff who give very good care and it helps us learn how to improve when your experience was below our aspiration of excellence.     Sincerely,    Stefano Whelan MD  Board Certified Emergency Physician

## 2025-06-17 ENCOUNTER — RESULTS FOLLOW-UP (OUTPATIENT)
Dept: EMERGENCY MEDICINE | Facility: OTHER | Age: 60
End: 2025-06-17

## 2025-06-17 LAB
OHS QRS DURATION: 110 MS
OHS QTC CALCULATION: 441 MS

## 2025-06-17 NOTE — ED PROVIDER NOTES
Encounter Date: 6/16/2025       History     Chief Complaint   Patient presents with    Abdominal Pain     Pt reports constant aching L sided abdominal pain onset ~1015 AM with intermittent sharp/stabbing pains. Pt denies any n/v, urinary s/s     This is a pleasant 60-year-old man with a past medical history significant for prostate cancer for which he underwent seed implantation in the past as well as radiation treatment as well as chronic constipation presenting for evaluation of abdominal cramping and pain along left side which intensified today.  Notes that he had an episode yesterday after having walked then drank a small volume of coffee with vague left upper sided abdominal discomfort thereafter which dissipated.  Today he had this low-level sensation of something similar which persisted then upon having some coffee had an intensification with spasms episodically thereafter causing him significant concern after he had been doubled over prompting him to come the emergency department.  He denies any fevers, chills, injuries, has not had any change in medications recently, had not take anything specifically try and help with this.  He has been recently adjusting his diet with a nutritionist in an attempt to obtain more regular bowel movements and says he has been having regular bowel movements which are loose logs over last 2-3 days.    The history is provided by the patient, the spouse and medical records.     Review of patient's allergies indicates:  No Known Allergies  Past Medical History:   Diagnosis Date    Gout, unspecified     Mixed hyperlipidemia     Unspecified astigmatism, unspecified eye     Unspecified sensorineural hearing loss      History reviewed. No pertinent surgical history.  Family History   Problem Relation Name Age of Onset    Stroke Mother      Hypertension Mother      Heart disease Mother      Hypertension Father      Cancer Father          Skin cancer    COPD Father      Deep vein  thrombosis Maternal Grandfather      Kidney disease Paternal Grandmother      Alzheimer's disease Paternal Grandmother      Hypertension Paternal Grandfather      Heart disease Paternal Grandfather      Cancer Maternal Uncle Uncle- Maternal 64        Prostate and pancreatic    Cancer Maternal Uncle Uncle-Maternal         Bladder and Prostate    Cancer Maternal Aunt          Chronic leukemia    Cancer Maternal Aunt  66        Colorectal    Hypertension Paternal Uncle      Heart disease Paternal Uncle      Cirrhosis Paternal Uncle      Heart disease Paternal Uncle      Cancer Paternal Uncle          Skin    Heart disease Paternal Aunt      Hypertension Paternal Aunt      Alzheimer's disease Paternal Aunt      Diabetes Paternal Aunt       Social History[1]  Review of Systems  Constitutional-no fever  HEENT-no congestion  Eyes-no redness  Respiratory-no shortness of breath  Cardio-no chest pain  GI-positive abdominal pain  Endocrine-no cold intolerance  -no difficulty urinating  MSK-no myalgias  Skin-no rashes  Allergy-no environmental allergy  Neurologic-, no headache  Hematology-no swollen nodes  Behavioral-no confusion  Physical Exam     Initial Vitals [06/16/25 1303]   BP Pulse Resp Temp SpO2   135/77 66 18 97.9 °F (36.6 °C) 100 %      MAP       --         Physical Exam  Constitutional:  Uncomfortable appearing 60-year-old man in mild distress  Eyes: Conjunctivae normal.  ENT       Head: Normocephalic, atraumatic.       Nose: Normal external appearance        Mouth/Throat: no strigulous respirations   Hematological/Lymphatic/Immunilogical: no visible lymphadenopathy   Cardiovascular: Normal rate,   Respiratory: Normal respiratory effort.   Gastrointestinal:  Soft, tenderness to deep palpation in the left lower quadrant, no rebound, no guarding  Musculoskeletal: Normal range of motion in all extremities. No obvious deformities or swelling.  Neurologic: Alert, oriented. Normal speech and language. No gross focal  neurologic deficits are appreciated.  Skin: Skin is warm, dry. No rash noted.  Psychiatric: Mood and affect are normal.   ED Course   Procedures  Labs Reviewed   COMPREHENSIVE METABOLIC PANEL - Abnormal       Result Value    Sodium 142      Potassium 5.6 (*)     Chloride 105      CO2 26      Glucose 105      BUN 21      Creatinine 1.0      Calcium 10.0      Protein Total 7.6      Albumin 4.4      Bilirubin Total 0.4      ALP 54      AST 26      ALT 21      Anion Gap 11      eGFR >60     CBC WITH DIFFERENTIAL - Abnormal    WBC 4.81      RBC 4.14 (*)     HGB 13.1 (*)     HCT 40.5      MCV 98      MCH 31.6 (*)     MCHC 32.3      RDW 12.4      Platelet Count 162      MPV 9.9      Nucleated RBC 0      Neut % 80.9 (*)     Lymph % 12.1 (*)     Mono % 5.2      Eos % 1.0      Basophil % 0.4      Imm Grans % 0.4      Neut # 3.89      Lymph # 0.58 (*)     Mono # 0.25 (*)     Eos # 0.05      Baso # 0.02      Imm Grans # 0.02     LIPASE - Normal    Lipase Level 49     CBC W/ AUTO DIFFERENTIAL    Narrative:     The following orders were created for panel order CBC auto differential.  Procedure                               Abnormality         Status                     ---------                               -----------         ------                     CBC with Differential[[id number]]       Abnormal            Final result                 Please view results for these tests on the individual orders.          Imaging Results              CT Abdomen Pelvis With IV Contrast NO Oral Contrast (Final result)  Result time 06/16/25 15:06:09      Final result by Trey Bosch III, MD (06/16/25 15:06:09)                   Impression:      No acute process seen.    Constipation.      Electronically signed by: Trey Bosch MD  Date:    06/16/2025  Time:    15:06               Narrative:    EXAMINATION:  CT ABDOMEN PELVIS WITH IV CONTRAST    CLINICAL HISTORY:  LLQ abdominal pain;    FINDINGS:  Patient was administered 100 cc of  Omnipaque 350 intravenously.    Lung bases are clear.  The liver, gallbladder, biliary tree, spleen, stomach, pancreas, and duodenum show nothing unusual.  The adrenal glands are not enlarged.  The kidneys demonstrate no mass, scar, stone, or hydronephrosis.  The vessels enhance normally.  There are few small mesenteric lymph nodes but no significant adenopathy seen.  The appendix is normal.  There are prostate radiation seeds.  There is constipation.  No inflammation mass ascites or inflammatory process seen.  Bones demonstrate nothing unusual.                                       Medications   ketorolac injection 9.999 mg (9.999 mg Intravenous Given 6/16/25 1545)   sodium chloride 0.9% bolus 500 mL 500 mL (0 mLs Intravenous Stopped 6/16/25 1637)   iohexoL (OMNIPAQUE 350) injection 100 mL (100 mLs Intravenous Given 6/16/25 1449)   aluminum-magnesium hydroxide-simethicone 200-200-20 mg/5 mL suspension 30 mL (30 mLs Oral Given 6/16/25 1641)     And   LIDOcaine viscous HCl 2% oral solution 15 mL (15 mLs Oral Given 6/16/25 1641)     Medical Decision Making  Abdominal pain represents a profoundly broad differential, this patient's symptoms are nondescript in nature and while unlikely could represent-  Pancreatitis, cholecystitis, appendicitis, gastritis, cystitis, pyleonephritis, PUD, obstructions constipation neoplasm among a myriad of other diagnoses.     A thorough examination and history was undertaken and appropriate diagnostics ordered with a diagnosis identified as below based on summative findings. It is possible this represents a more sinister underlying process and as such precautions related thereto were specifically discussed with the patient.     Problems Addressed:  Bloating: acute illness or injury  Epigastric pain: acute illness or injury  Gastritis, presence of bleeding unspecified, unspecified chronicity, unspecified gastritis type: acute illness or injury    Amount and/or Complexity of Data  Reviewed  Independent Historian: spouse     Details: Notes significant baseline   External Data Reviewed: labs, radiology, ECG and notes.  Labs: ordered. Decision-making details documented in ED Course.  Radiology: ordered and independent interpretation performed. Decision-making details documented in ED Course.    Risk  OTC drugs.  Prescription drug management.  Parenteral controlled substances.  Decision regarding hospitalization.  Diagnosis or treatment significantly limited by social determinants of health.               ED Course as of 25 0652      1437 My EKG interpretation, sinus bradycardia, 59 beats per minute, normal axis, right bundle-branch block, no ST segment changes, no previous EKG for comparison [TK]      ED Course User Index  [TK] Stefano Whelan MD                           Clinical Impression:  Final diagnoses:  [R14.0] Bloating (Primary)  [R10.13] Epigastric pain  [K29.70] Gastritis, presence of bleeding unspecified, unspecified chronicity, unspecified gastritis type          ED Disposition Condition    Discharge Stable          ED Prescriptions       Medication Sig Dispense Start Date End Date Auth. Provider    simethicone 42 mg Chew Take 1 capsule by mouth 3 (three) times daily as needed (bloating). 50 tablet 2025 -- Stefano Whelan MD    omeprazole (PRILOSEC) 20 MG capsule Take 1 capsule (20 mg total) by mouth once daily. 90 capsule 2025 Stefano Whelan MD    magnesium citrate solution () Take 296 mLs by mouth once. for 1 dose 296 mL 2025 Stefano Whelan MD          Follow-up Information       Follow up With Specialties Details Why Contact Info    Hardik Barnhart MD Gastroenterology Schedule an appointment as soon as possible for a visit  As needed, For a follow up visit about today [address]  [address]  [facility]STRO[facility]  [facility] [address]  [phone]            [facility]  MDM  MDCalc MDM Module  Jun 17 2025 6:52 AM [Stefano Whelan]  Data:  - Independent interpretation: I independently reviewed the CT Abd+Pelvis W contr IV. It showed no acute abnormality. [Stefano Whelan]  - Test/documents/historian: 3+ tests ordered  Problems: Concern for High-risk intra-abdominal pathology: bowel obstruction  Additional encounter diagnoses: Bloating, Epigastric pain, Gastritis, presence of bleeding unspecified, unspecified chronicity, unspecified gastritis type  Risk: CT Abd+Pelvis W contr IV (Iodinated IV contrast in patient w/ elevated risk)             [1]   Social History  Tobacco Use    Smoking status: Never    Smokeless tobacco: Never   Substance Use Topics    Alcohol use: Not Currently    Drug use: Never        Stefano Whelan MD  06/17/25 0645

## 2025-06-30 ENCOUNTER — CLINICAL SUPPORT (OUTPATIENT)
Dept: REHABILITATION | Facility: OTHER | Age: 60
End: 2025-06-30
Payer: OTHER GOVERNMENT

## 2025-06-30 DIAGNOSIS — M25.511 NONTRAUMATIC PAIN OF RIGHT SHOULDER: ICD-10-CM

## 2025-06-30 DIAGNOSIS — M25.611 DECREASED ROM OF RIGHT SHOULDER: Primary | ICD-10-CM

## 2025-06-30 PROCEDURE — 20561 NDL INSJ W/O NJX 3+ MUSC: CPT | Mod: PN

## 2025-06-30 PROCEDURE — 97530 THERAPEUTIC ACTIVITIES: CPT | Mod: PN

## 2025-06-30 PROCEDURE — 97140 MANUAL THERAPY 1/> REGIONS: CPT | Mod: PN,CG

## 2025-06-30 NOTE — PROGRESS NOTES
Outpatient Rehab    Physical Therapy Progress Note : Updated Plan of Care    Patient Name: Freedom Carranza  MRN: [id number]  YOB: 1965  Encounter Date: 6/30/2025    Therapy Diagnosis:   Encounter Diagnoses   Name Primary?    Decreased ROM of right shoulder Yes    Nontraumatic pain of right shoulder      Physician: Steve Cruz PA-C    Physician Orders: Eval and Treat  Medical Diagnosis: Chronic right shoulder pain  Shoulder stiffness, right  Adhesive capsulitis of right shoulder  Visit # / Visits Authorized: 14 (13 / 40)  Insurance Authorization Period: 3/27/2025 to 9/10/2025  Date of Evaluation: 3/18/2025  Plan of Care Certification: 3/20/2025 to 6/12/2025      PT/PTA: PT   Number of PTA visits since last PT visit:0  Time In: 1101   Time Out: 1206  Total Time (in minutes): 65   Total Billable Time (in minutes): 65    FOTO:  Intake Score: 45%  Survey Score 2: 50%  Survey Score 3: 57%    Precautions: Standard       Subjective   states the tightness is more in the lats area. [place] and fell and rolled. hit on his back and R flank. That pain supercedes everything else. R shoulder joel is 0/10 at rest and 5/10 with movement. Went to the ED 2 weeks ago, felt low iritation in his stomach that passed in 2-3 hours. The next day, the pain came back with sharp painfor brief, intense periods.  Pain reported as 0/10. location: R shoulder    Objective      Shoulder Range of Motion  Right Shoulder   Active (deg) Passive (deg) Pain   Flexion 120       Extension 55       ABduction 120       External Rotation (Shoulder ABducted 0 degrees) 52       External Rotation (Shoulder ABducted 45 degrees) 60       External Rotation (Shoulder ABducted 90 degrees) 40       Internal Rotation (Shoulder ABducted 45 degrees) 40         Left Shoulder   Active (deg) Passive (deg) Pain   Flexion 142       Extension 65       ABduction 155       External Rotation (Shoulder ABducted 45 degrees) 68                Treatment:  Manual Therapy  MT 5: Application of TDN: Pt educated on benefits and potential side effects of dry needling. Educated pt on benefits, precautions, side effects following TDN. Educated pt to use heat following treatment sessions if pt is experiencing pain or soreness. Pt verbalized good understanding of education.  Pt signed written consent to dry needling. Pt gave verbal consent for DN    Pt received dry needling to R pectoralis (fanning technique) and R latissimus dorsi (2 needles) muscles using 40mm needles.    30 mm needles: Teres major x2; Teres minor x2; infraspinatus x2 with E-stim applied through 3 channels at 2 Hz and 4-6 mA to tolerance.  Therapeutic Activity  TA 1: reassessment and FOTO    Time Entry(in minutes):  Manual Therapy Time Entry: 35  Therapeutic Activity Time Entry: 30    Assessment & Plan   Assessment  Patient continues to present with tightness in right upper extremity, but has increased right shoulder active range of motion flexion and abduction. Applied KT tape today to support right shoulder movement and decrease pain into right lateral arm.   Evaluation/Treatment Tolerance: Patient tolerated treatment well  Plan  From a physical therapy perspective, the patient would benefit from: Skilled Rehab Services    Planned therapy interventions include: Therapeutic exercise, Therapeutic activities, Neuromuscular re-education, Manual therapy, Gait training, and Other (Comment). dry needling  Planned modalities to include: Cryotherapy (cold pack), Electrical stimulation - passive/unattended, Thermotherapy (hot pack), and Ultrasound.        Visit Frequency: 1 times Per Week for 12 Weeks.       This plan was discussed with Patient and Family.   Discussion participants: Agreed Upon Plan of Care  Plan details: Patient will be out of town in [place] for the month of July. Will resume therapy when he returns to [facility]. WIll also follow up with ortho PA when he comes back in town in  August.          The patient will continue to benefit from skilled outpatient physical therapy in order to address the deficits listed in the problem list on the initial evaluation, provide patient and family education, and maximize the patients level of independence in the home and community environments.     The patient's spiritual, cultural, and educational needs were considered, and the patient is agreeable to the plan of care and goals.           Goals:   Active       1. short term goal        Patient will be independent with home exercise program to promote improved therapy outcomes.   (Met)       Start:  03/20/25    Expected End:  05/01/25    Resolved:  04/17/25         Patient will require minimal Verbal cues from physical therapist for proper scapular retraction in order to improve postural awareness.  (Met)       Start:  03/20/25    Expected End:  05/01/25    Resolved:  04/17/25         Patient will increase pain free passive range of motion of R shoulder flexion to >/= 150 deg and external rotation at 90 degrees abduction to 30 degrees to improve functional mobility of upper extremity.  (Progressing)       Start:  03/20/25    Expected End:  09/22/25               2. long term goal        Patient will improve Focus On therapeutic Outcomes (FOTO) from 45 to 68 to demonstrate improved functional mobility.   (Progressing)       Start:  03/20/25    Expected End:  09/22/25            Patient will increase R shoulder active range of motion to equal the left to improve functional use of right upper extremity for dressing and bathing. (Progressing)       Start:  03/20/25    Expected End:  09/22/25            Patient will  improve right shoulder Manual Muscle tests to = 4/5 to promote equal use of bilateral upper extremities in performing functional tasks. (Progressing)       Start:  03/20/25    Expected End:  09/22/25                Que Rosa, PT

## 2025-09-04 ENCOUNTER — OFFICE VISIT (OUTPATIENT)
Dept: SPORTS MEDICINE | Facility: CLINIC | Age: 60
End: 2025-09-04
Payer: OTHER GOVERNMENT

## 2025-09-04 VITALS
HEART RATE: 75 BPM | BODY MASS INDEX: 27 KG/M2 | DIASTOLIC BLOOD PRESSURE: 65 MMHG | SYSTOLIC BLOOD PRESSURE: 105 MMHG | WEIGHT: 199.31 LBS | HEIGHT: 72 IN

## 2025-09-04 DIAGNOSIS — M75.01 ADHESIVE CAPSULITIS OF RIGHT SHOULDER: ICD-10-CM

## 2025-09-04 DIAGNOSIS — G89.29 CHRONIC RIGHT SHOULDER PAIN: Primary | ICD-10-CM

## 2025-09-04 DIAGNOSIS — M25.611 SHOULDER STIFFNESS, RIGHT: ICD-10-CM

## 2025-09-04 DIAGNOSIS — M25.511 CHRONIC RIGHT SHOULDER PAIN: Primary | ICD-10-CM

## 2025-09-04 PROCEDURE — 99999PBSHW PR PBB SHADOW TECHNICAL ONLY FILED TO HB: Mod: PBBFAC,,,

## 2025-09-04 PROCEDURE — 99214 OFFICE O/P EST MOD 30 MIN: CPT | Mod: PBBFAC | Performed by: PHYSICIAN ASSISTANT

## 2025-09-04 PROCEDURE — 99214 OFFICE O/P EST MOD 30 MIN: CPT | Mod: 25,S$PBB,, | Performed by: PHYSICIAN ASSISTANT

## 2025-09-04 PROCEDURE — 99999 PR PBB SHADOW E&M-EST. PATIENT-LVL IV: CPT | Mod: PBBFAC,,, | Performed by: PHYSICIAN ASSISTANT

## 2025-09-04 PROCEDURE — 20610 DRAIN/INJ JOINT/BURSA W/O US: CPT | Mod: PBBFAC,RT | Performed by: PHYSICIAN ASSISTANT

## 2025-09-04 PROCEDURE — 20610 DRAIN/INJ JOINT/BURSA W/O US: CPT | Mod: S$PBB,RT,, | Performed by: PHYSICIAN ASSISTANT

## 2025-09-04 RX ORDER — TRIAMCINOLONE ACETONIDE 40 MG/ML
40 INJECTION, SUSPENSION INTRA-ARTICULAR; INTRAMUSCULAR
Status: DISCONTINUED | OUTPATIENT
Start: 2025-09-04 | End: 2025-09-04 | Stop reason: HOSPADM

## 2025-09-04 RX ADMIN — TRIAMCINOLONE ACETONIDE 40 MG: 40 INJECTION, SUSPENSION INTRA-ARTICULAR; INTRAMUSCULAR at 02:09
